# Patient Record
Sex: FEMALE | Race: WHITE | Employment: FULL TIME | URBAN - METROPOLITAN AREA
[De-identification: names, ages, dates, MRNs, and addresses within clinical notes are randomized per-mention and may not be internally consistent; named-entity substitution may affect disease eponyms.]

---

## 2019-05-10 ENCOUNTER — ANESTHESIA - HEALTHEAST (OUTPATIENT)
Dept: SURGERY | Facility: HOSPITAL | Age: 59
End: 2019-05-10

## 2019-05-10 ENCOUNTER — COMMUNICATION - HEALTHEAST (OUTPATIENT)
Dept: SCHEDULING | Facility: CLINIC | Age: 59
End: 2019-05-10

## 2019-05-10 ENCOUNTER — SURGERY - HEALTHEAST (OUTPATIENT)
Dept: SURGERY | Facility: HOSPITAL | Age: 59
End: 2019-05-10

## 2019-05-10 ASSESSMENT — MIFFLIN-ST. JEOR: SCORE: 1136.07

## 2019-05-28 ENCOUNTER — OFFICE VISIT - HEALTHEAST (OUTPATIENT)
Dept: SURGERY | Facility: CLINIC | Age: 59
End: 2019-05-28

## 2019-05-28 DIAGNOSIS — Z48.89 POSTOPERATIVE VISIT: ICD-10-CM

## 2019-05-28 ASSESSMENT — MIFFLIN-ST. JEOR: SCORE: 1146.05

## 2020-10-12 ENCOUNTER — TELEPHONE (OUTPATIENT)
Dept: TRANSPLANT | Facility: CLINIC | Age: 60
End: 2020-10-12

## 2020-10-12 NOTE — TELEPHONE ENCOUNTER
"[facility] BREEZE  [id number]      LIVING KIDNEY DONOR EVALUATION  Donor First Name SONIA Donor MRCHERYL    Donor Last Name AKIRA Completed 10/7/2020 9:24 AM    1960 Record ID [id number]   BREEZE Screen PASSED     Intended Recipient  Recipient First Name ALTRUISTIC Recipient MRN    Recipient Last Name ALTRUISTIC Relationship N/A   Recipient   Recipient Diagnosis    Recipient's ABO      Donor Information  Age 60 Gender Female   Ht 165 cm (5' 5'') Race    Wt 54.4 kg (120 lbs) Ethnicity Not /   BMI 20.00 kg/m  Preferred Language English      Required No     Blood Type AB   Demographics  Home Address [address] # [id number]   [place] Type Mobile   State [place] Alternate #    [facility] Code [address] Type    Country [place] Preferred Contact day    Email [email or web address] Preferred Contact time    &&   Donor's Medical Information  Medical History History of miscarriage   Menopause   Post-Traumatic Stress Disorder (PTSD)   Skin Cancer NOS   s/p  Medications \"Levothyroxine\"   \"magnesium supplement\"   \"multi-vitamin\"   Surgical History Appendectomy      Dilation and Curettage   Breast Augmentation   Breast Implant Replacement Allergies NKDA   Social History EtOH: Occasional (1-2 drinks/week)   Illicit Drug Use: Denies   Tobacco: Denies Self-Reported Functional Status \"I am able to participate in strenuous sports such as swimming, singles tennis, football, basketball, or skiing\"   Family Medical History Cancer (Father, Mother, Aunt or Uncle)   Diabetes (denies)   Heart Disease (Father)   Hypertension (Mother)   Kidney Disease (denies)   Kidney Stones (denies) Exercise Frequency Exercise (>3 times per week)   Review of Organ Systems  Review of Systems Airway or Lungs: No   Blood Disorder: No   Cancer: Yes   Diabetes,Thyroid,Adrenal,Endocrine Disorder: No   Digestive or Liver: No   Female Health: Yes   Heart or Circulatory " System: No   Immune Diseases: No   Kidneys and Bladder: No   Muscles,Bones,Joints: No   Neuro: No   Psych: Yes   &&   Donor's Social Information  Marital Status  Living Accommodation Lives in rented accommodation   Level of Education Graduate or professional degree complete Living Arrangement With no relatives, residential arrangement   Employment Status Full Time Concerns: health and life insurance Yes   Employer [employer] Concerns: job security and lost income No   Occupation      Medical Insurance Status Has medical insurance     High Risk Behavior  High Risk Behaviors Blood transfusion < 12 months. (NO)   Commercial sex < 12 months. (NO)   Illicit IV drug use < 5yrs. (NO)   Other high risk sexual contact < 12 months. (NO)   Reason for Donation  Referral Other ([facility] Brief) Reason for Donation Because I am healthy and I can. It would be a wonderful gift to someone who needs it.   Permission to Disclose Inquiry Yes Patient Comments    Donor Motivation Level Ready to start evaluation with reservations     PCP Contact  PCP Name Dr Mitch Pal   PCP [facility]   PCP [facility]   PCP Phone [phone]   Emergency Contact  First Name Rasheed First Name Marlene   Last Name Keri Last Name Keri   Phone # [phone] Phone # [phone]   Phone Type Mobile Phone Type Mobile   Relationship Spouse Relationship Daughter   Office Use  Reviewed By    Reviewed 10/12/2020 2:32 PM   Admin Folder Archive   Comments    Lost for Followup    Extended Comments    BREEZE ID [email or web address]:[email or web address].[id number] survey status completed   Activity History  Call  Due Date 10/9/2020   Last Modified Date/Time 10/9/2020 1:08 PM   Comments

## 2020-10-13 DIAGNOSIS — Z00.5 TRANSPLANT DONOR EVALUATION: Primary | ICD-10-CM

## 2020-10-13 NOTE — TELEPHONE ENCOUNTER
NDD. Hx Basil cell skin CA 5yrs ago. Followup checks have been OK.Is abo B.Will send info/forms.Sched labs at local  clinic.

## 2020-11-06 ENCOUNTER — ALLIED HEALTH/NURSE VISIT (OUTPATIENT)
Dept: NURSING | Facility: CLINIC | Age: 60
End: 2020-11-06

## 2020-11-06 VITALS
DIASTOLIC BLOOD PRESSURE: 70 MMHG | WEIGHT: 124.6 LBS | BODY MASS INDEX: 20.76 KG/M2 | SYSTOLIC BLOOD PRESSURE: 112 MMHG | HEIGHT: 65 IN

## 2020-11-06 DIAGNOSIS — Z00.5 EXAMINATION OF POTENTIAL DONOR OF ORGAN AND TISSUE: Primary | ICD-10-CM

## 2020-11-06 DIAGNOSIS — Z00.5 TRANSPLANT DONOR EVALUATION: ICD-10-CM

## 2020-11-06 LAB
ABO + RH BLD: NORMAL
ABO + RH BLD: NORMAL
ALBUMIN UR-MCNC: NEGATIVE MG/DL
APPEARANCE UR: CLEAR
BILIRUB UR QL STRIP: NEGATIVE
COLOR UR AUTO: YELLOW
CREAT SERPL-MCNC: 0.71 MG/DL (ref 0.52–1.04)
GFR SERPL CREATININE-BSD FRML MDRD: >90 ML/MIN/{1.73_M2}
GLUCOSE SERPL-MCNC: 82 MG/DL (ref 70–99)
GLUCOSE UR STRIP-MCNC: NEGATIVE MG/DL
HGB BLD-MCNC: 12.2 G/DL (ref 11.7–15.7)
HGB UR QL STRIP: NEGATIVE
KETONES UR STRIP-MCNC: NEGATIVE MG/DL
LEUKOCYTE ESTERASE UR QL STRIP: ABNORMAL
NITRATE UR QL: NEGATIVE
NON-SQ EPI CELLS #/AREA URNS LPF: ABNORMAL /LPF
PH UR STRIP: 6 PH (ref 5–7)
PROT UR-MCNC: 0.07 G/L
PROT/CREAT 24H UR: 0.08 G/G CR (ref 0–0.2)
RBC #/AREA URNS AUTO: ABNORMAL /HPF
SOURCE: ABNORMAL
SP GR UR STRIP: 1.01 (ref 1–1.03)
SPECIMEN EXP DATE BLD: NORMAL
UROBILINOGEN UR STRIP-ACNC: 0.2 EU/DL (ref 0.2–1)
WBC #/AREA URNS AUTO: ABNORMAL /HPF

## 2020-11-06 PROCEDURE — 36415 COLL VENOUS BLD VENIPUNCTURE: CPT | Performed by: SURGERY

## 2020-11-06 PROCEDURE — 99207 PR NO CHARGE NURSE ONLY: CPT

## 2020-11-06 PROCEDURE — 82565 ASSAY OF CREATININE: CPT | Performed by: SURGERY

## 2020-11-06 ASSESSMENT — MIFFLIN-ST. JEOR: SCORE: 1137.31

## 2020-11-06 NOTE — PROGRESS NOTES
I recorded a height, weight and three blood pressures 15 minutes apart.  I validated with the patient they have already had their lab appointment, have one today or one in the future. Eloy Urrutia MA

## 2020-11-07 LAB
CREAT UR-MCNC: 79 MG/DL
MICROALBUMIN UR-MCNC: 5 MG/L
MICROALBUMIN/CREAT UR: 6.87 MG/G CR (ref 0–25)

## 2021-01-21 ENCOUNTER — TELEPHONE (OUTPATIENT)
Dept: TRANSPLANT | Facility: CLINIC | Age: 61
End: 2021-01-21

## 2021-01-21 DIAGNOSIS — Z00.5 TRANSPLANT DONOR EVALUATION: ICD-10-CM

## 2021-01-21 NOTE — TELEPHONE ENCOUNTER
Please sched kidney donor eval for 2/5/21 slot 2.Sched [facility]. COVID/eval labs sched for 2/1.Is signing up for [facility].I will put in orders.

## 2021-01-25 ENCOUNTER — DOCUMENTATION ONLY (OUTPATIENT)
Dept: CARE COORDINATION | Facility: CLINIC | Age: 61
End: 2021-01-25

## 2021-02-01 DIAGNOSIS — Z00.5 TRANSPLANT DONOR EVALUATION: ICD-10-CM

## 2021-02-01 LAB
ABO + RH BLD: NORMAL
ABO + RH BLD: NORMAL
ALBUMIN SERPL-MCNC: 4.3 G/DL (ref 3.4–5)
ALBUMIN UR-MCNC: NEGATIVE MG/DL
ALP SERPL-CCNC: 102 U/L (ref 40–150)
ALT SERPL W P-5'-P-CCNC: 35 U/L (ref 0–50)
ANION GAP SERPL CALCULATED.3IONS-SCNC: 5 MMOL/L (ref 3–14)
APPEARANCE UR: CLEAR
APTT PPP: 29 SEC (ref 22–37)
AST SERPL W P-5'-P-CCNC: 22 U/L (ref 0–45)
BILIRUB DIRECT SERPL-MCNC: 0.1 MG/DL (ref 0–0.2)
BILIRUB SERPL-MCNC: 0.5 MG/DL (ref 0.2–1.3)
BILIRUB UR QL STRIP: NEGATIVE
BLD GP AB SCN SERPL QL: NORMAL
BLOOD BANK CMNT PATIENT-IMP: NORMAL
BUN SERPL-MCNC: 14 MG/DL (ref 7–30)
CALCIUM SERPL-MCNC: 9.4 MG/DL (ref 8.5–10.1)
CHLORIDE SERPL-SCNC: 106 MMOL/L (ref 94–109)
CHOLEST SERPL-MCNC: 249 MG/DL
CMV IGG SERPL QL IA: <0.2 AI (ref 0–0.8)
CO2 SERPL-SCNC: 29 MMOL/L (ref 20–32)
COLOR UR AUTO: YELLOW
CREAT SERPL-MCNC: 0.65 MG/DL (ref 0.52–1.04)
CREAT UR-MCNC: 123 MG/DL
EBV VCA IGG SER QL IA: 5.1 AI (ref 0–0.8)
EBV VCA IGM SER QL IA: <0.2 AI (ref 0–0.8)
ERYTHROCYTE [DISTWIDTH] IN BLOOD BY AUTOMATED COUNT: 12.5 % (ref 10–15)
GFR SERPL CREATININE-BSD FRML MDRD: >90 ML/MIN/{1.73_M2}
GLUCOSE SERPL-MCNC: 92 MG/DL (ref 70–99)
GLUCOSE UR STRIP-MCNC: NEGATIVE MG/DL
HBA1C MFR BLD: 5.3 % (ref 0–5.6)
HBV CORE AB SERPL QL IA: NONREACTIVE
HBV SURFACE AB SERPL IA-ACNC: 0 M[IU]/ML
HBV SURFACE AG SERPL QL IA: NONREACTIVE
HCT VFR BLD AUTO: 41.3 % (ref 35–47)
HCV AB SERPL QL IA: NONREACTIVE
HDLC SERPL-MCNC: 107 MG/DL
HGB BLD-MCNC: 13.6 G/DL (ref 11.7–15.7)
HGB UR QL STRIP: NEGATIVE
HIV 1+2 AB+HIV1 P24 AG SERPL QL IA: NONREACTIVE
INR PPP: 1.01 (ref 0.86–1.14)
KETONES UR STRIP-MCNC: NEGATIVE MG/DL
LABORATORY COMMENT REPORT: NORMAL
LDLC SERPL CALC-MCNC: 116 MG/DL
LEUKOCYTE ESTERASE UR QL STRIP: ABNORMAL
MCH RBC QN AUTO: 31.2 PG (ref 26.5–33)
MCHC RBC AUTO-ENTMCNC: 32.9 G/DL (ref 31.5–36.5)
MCV RBC AUTO: 95 FL (ref 78–100)
MICROALBUMIN UR-MCNC: 7 MG/L
MICROALBUMIN/CREAT UR: 5.39 MG/G CR (ref 0–25)
MUCOUS THREADS #/AREA URNS LPF: PRESENT /LPF
NITRATE UR QL: NEGATIVE
NONHDLC SERPL-MCNC: 142 MG/DL
PH UR STRIP: 6 PH (ref 5–7)
PHOSPHATE SERPL-MCNC: 3.7 MG/DL (ref 2.5–4.5)
PLATELET # BLD AUTO: 169 10E9/L (ref 150–450)
POTASSIUM SERPL-SCNC: 3.7 MMOL/L (ref 3.4–5.3)
PROT SERPL-MCNC: 7.8 G/DL (ref 6.8–8.8)
PROT UR-MCNC: 0.11 G/L
PROT/CREAT 24H UR: 0.09 G/G CR (ref 0–0.2)
RBC # BLD AUTO: 4.36 10E12/L (ref 3.8–5.2)
RBC #/AREA URNS AUTO: 1 /HPF (ref 0–2)
SARS-COV-2 RNA RESP QL NAA+PROBE: NEGATIVE
SARS-COV-2 RNA RESP QL NAA+PROBE: NORMAL
SODIUM SERPL-SCNC: 141 MMOL/L (ref 133–144)
SOURCE: ABNORMAL
SP GR UR STRIP: 1.01 (ref 1–1.03)
SPECIMEN EXP DATE BLD: NORMAL
SPECIMEN SOURCE: NORMAL
SPECIMEN SOURCE: NORMAL
T PALLIDUM AB SER QL: NONREACTIVE
TRIGL SERPL-MCNC: 126 MG/DL
URATE SERPL-MCNC: 3.9 MG/DL (ref 2.6–6)
UROBILINOGEN UR STRIP-MCNC: 0 MG/DL (ref 0–2)
WBC # BLD AUTO: 5 10E9/L (ref 4–11)
WBC #/AREA URNS AUTO: 8 /HPF (ref 0–5)

## 2021-02-01 PROCEDURE — 80061 LIPID PANEL: CPT | Performed by: PATHOLOGY

## 2021-02-01 PROCEDURE — 85610 PROTHROMBIN TIME: CPT | Performed by: PATHOLOGY

## 2021-02-01 PROCEDURE — 84550 ASSAY OF BLOOD/URIC ACID: CPT | Performed by: PATHOLOGY

## 2021-02-01 PROCEDURE — 86803 HEPATITIS C AB TEST: CPT | Mod: 90 | Performed by: PATHOLOGY

## 2021-02-01 PROCEDURE — U0005 INFEC AGEN DETEC AMPLI PROBE: HCPCS | Mod: 90 | Performed by: PATHOLOGY

## 2021-02-01 PROCEDURE — 86706 HEP B SURFACE ANTIBODY: CPT | Mod: 90 | Performed by: PATHOLOGY

## 2021-02-01 PROCEDURE — U0003 INFECTIOUS AGENT DETECTION BY NUCLEIC ACID (DNA OR RNA); SEVERE ACUTE RESPIRATORY SYNDROME CORONAVIRUS 2 (SARS-COV-2) (CORONAVIRUS DISEASE [COVID-19]), AMPLIFIED PROBE TECHNIQUE, MAKING USE OF HIGH THROUGHPUT TECHNOLOGIES AS DESCRIBED BY CMS-2020-01-R: HCPCS | Mod: 90 | Performed by: PATHOLOGY

## 2021-02-01 PROCEDURE — 86665 EPSTEIN-BARR CAPSID VCA: CPT | Mod: 90 | Performed by: PATHOLOGY

## 2021-02-01 PROCEDURE — 86481 TB AG RESPONSE T-CELL SUSP: CPT | Mod: 90 | Performed by: PATHOLOGY

## 2021-02-01 PROCEDURE — 85027 COMPLETE CBC AUTOMATED: CPT | Performed by: PATHOLOGY

## 2021-02-01 PROCEDURE — 86704 HEP B CORE ANTIBODY TOTAL: CPT | Mod: 90 | Performed by: PATHOLOGY

## 2021-02-01 PROCEDURE — 80053 COMPREHEN METABOLIC PANEL: CPT | Performed by: PATHOLOGY

## 2021-02-01 PROCEDURE — 84156 ASSAY OF PROTEIN URINE: CPT | Performed by: PATHOLOGY

## 2021-02-01 PROCEDURE — 86644 CMV ANTIBODY: CPT | Mod: 90 | Performed by: PATHOLOGY

## 2021-02-01 PROCEDURE — 82043 UR ALBUMIN QUANTITATIVE: CPT | Mod: 90 | Performed by: PATHOLOGY

## 2021-02-01 PROCEDURE — 86850 RBC ANTIBODY SCREEN: CPT | Mod: 90 | Performed by: PATHOLOGY

## 2021-02-01 PROCEDURE — 36415 COLL VENOUS BLD VENIPUNCTURE: CPT | Performed by: PATHOLOGY

## 2021-02-01 PROCEDURE — 81001 URINALYSIS AUTO W/SCOPE: CPT | Performed by: PATHOLOGY

## 2021-02-01 PROCEDURE — 87340 HEPATITIS B SURFACE AG IA: CPT | Mod: 90 | Performed by: PATHOLOGY

## 2021-02-01 PROCEDURE — 84100 ASSAY OF PHOSPHORUS: CPT | Performed by: PATHOLOGY

## 2021-02-01 PROCEDURE — 86900 BLOOD TYPING SEROLOGIC ABO: CPT | Mod: 90 | Performed by: PATHOLOGY

## 2021-02-01 PROCEDURE — 82248 BILIRUBIN DIRECT: CPT | Performed by: PATHOLOGY

## 2021-02-01 PROCEDURE — 85730 THROMBOPLASTIN TIME PARTIAL: CPT | Performed by: PATHOLOGY

## 2021-02-01 PROCEDURE — 83036 HEMOGLOBIN GLYCOSYLATED A1C: CPT | Mod: 90 | Performed by: PATHOLOGY

## 2021-02-01 PROCEDURE — 86780 TREPONEMA PALLIDUM: CPT | Performed by: PATHOLOGY

## 2021-02-01 PROCEDURE — 86901 BLOOD TYPING SEROLOGIC RH(D): CPT | Mod: 90 | Performed by: PATHOLOGY

## 2021-02-02 LAB
GAMMA INTERFERON BACKGROUND BLD IA-ACNC: 0.01 IU/ML
M TB IFN-G CD4+ BCKGRND COR BLD-ACNC: 9.99 IU/ML
M TB TUBERC IFN-G BLD QL: NEGATIVE
MITOGEN IGNF BCKGRD COR BLD-ACNC: 0.01 IU/ML
MITOGEN IGNF BCKGRD COR BLD-ACNC: 0.02 IU/ML

## 2021-02-04 ENCOUNTER — TELEPHONE (OUTPATIENT)
Dept: TRANSPLANT | Facility: CLINIC | Age: 61
End: 2021-02-04

## 2021-02-04 DIAGNOSIS — Z00.5 EXAMINATION OF POTENTIAL DONOR OF ORGAN AND TISSUE: Primary | ICD-10-CM

## 2021-02-05 ENCOUNTER — ALLIED HEALTH/NURSE VISIT (OUTPATIENT)
Dept: TRANSPLANT | Facility: CLINIC | Age: 61
End: 2021-02-05
Attending: SURGERY

## 2021-02-05 ENCOUNTER — INFUSION THERAPY VISIT (OUTPATIENT)
Dept: INFUSION THERAPY | Facility: CLINIC | Age: 61
End: 2021-02-05
Attending: INTERNAL MEDICINE

## 2021-02-05 ENCOUNTER — APPOINTMENT (OUTPATIENT)
Dept: TRANSPLANT | Facility: CLINIC | Age: 61
End: 2021-02-05
Attending: SURGERY

## 2021-02-05 ENCOUNTER — ANCILLARY PROCEDURE (OUTPATIENT)
Dept: CT IMAGING | Facility: CLINIC | Age: 61
End: 2021-02-05
Attending: INTERNAL MEDICINE
Payer: COMMERCIAL

## 2021-02-05 ENCOUNTER — OFFICE VISIT (OUTPATIENT)
Dept: NEPHROLOGY | Facility: CLINIC | Age: 61
End: 2021-02-05
Attending: SURGERY

## 2021-02-05 ENCOUNTER — OFFICE VISIT (OUTPATIENT)
Dept: TRANSPLANT | Facility: CLINIC | Age: 61
End: 2021-02-05
Attending: SURGERY

## 2021-02-05 ENCOUNTER — ANCILLARY PROCEDURE (OUTPATIENT)
Dept: GENERAL RADIOLOGY | Facility: CLINIC | Age: 61
End: 2021-02-05
Attending: INTERNAL MEDICINE
Payer: COMMERCIAL

## 2021-02-05 VITALS
WEIGHT: 121.7 LBS | BODY MASS INDEX: 20.28 KG/M2 | SYSTOLIC BLOOD PRESSURE: 113 MMHG | DIASTOLIC BLOOD PRESSURE: 71 MMHG | OXYGEN SATURATION: 98 % | HEIGHT: 65 IN | HEART RATE: 67 BPM

## 2021-02-05 VITALS
WEIGHT: 121.7 LBS | SYSTOLIC BLOOD PRESSURE: 113 MMHG | HEIGHT: 65 IN | OXYGEN SATURATION: 98 % | HEART RATE: 67 BPM | BODY MASS INDEX: 20.28 KG/M2 | DIASTOLIC BLOOD PRESSURE: 71 MMHG

## 2021-02-05 DIAGNOSIS — Z00.5 TRANSPLANT DONOR EVALUATION: ICD-10-CM

## 2021-02-05 DIAGNOSIS — Z00.5 TRANSPLANT DONOR EVALUATION: Primary | ICD-10-CM

## 2021-02-05 DIAGNOSIS — Z00.5 EXAMINATION OF POTENTIAL DONOR OF ORGAN AND TISSUE: ICD-10-CM

## 2021-02-05 LAB
ALBUMIN UR-MCNC: NEGATIVE MG/DL
APPEARANCE UR: CLEAR
BILIRUB UR QL STRIP: NEGATIVE
COLOR UR AUTO: NORMAL
GLUCOSE UR STRIP-MCNC: NEGATIVE MG/DL
HGB UR QL STRIP: NEGATIVE
INTERPRETATION ECG - MUSE: NORMAL
KETONES UR STRIP-MCNC: NEGATIVE MG/DL
LEUKOCYTE ESTERASE UR QL STRIP: NEGATIVE
NITRATE UR QL: NEGATIVE
PH UR STRIP: 7 PH (ref 5–7)
RBC #/AREA URNS AUTO: <1 /HPF (ref 0–2)
SOURCE: NORMAL
SP GR UR STRIP: 1.01 (ref 1–1.03)
UROBILINOGEN UR STRIP-MCNC: 0 MG/DL (ref 0–2)
WBC #/AREA URNS AUTO: 0 /HPF (ref 0–5)

## 2021-02-05 PROCEDURE — 82542 COL CHROMOTOGRAPHY QUAL/QUAN: CPT | Performed by: INTERNAL MEDICINE

## 2021-02-05 PROCEDURE — 74175 CTA ABDOMEN W/CONTRAST: CPT | Performed by: RADIOLOGY

## 2021-02-05 PROCEDURE — 99203 OFFICE O/P NEW LOW 30 MIN: CPT | Performed by: TRANSPLANT SURGERY

## 2021-02-05 PROCEDURE — 71046 X-RAY EXAM CHEST 2 VIEWS: CPT | Mod: GC | Performed by: RADIOLOGY

## 2021-02-05 PROCEDURE — 99207 PR SATISFY VISIT NUMBER: CPT | Performed by: TRANSPLANT SURGERY

## 2021-02-05 PROCEDURE — 99245 OFF/OP CONSLTJ NEW/EST HI 55: CPT

## 2021-02-05 PROCEDURE — 81001 URINALYSIS AUTO W/SCOPE: CPT | Performed by: INTERNAL MEDICINE

## 2021-02-05 PROCEDURE — 250N000011 HC RX IP 250 OP 636: Mod: JW | Performed by: INTERNAL MEDICINE

## 2021-02-05 PROCEDURE — 96374 THER/PROPH/DIAG INJ IV PUSH: CPT

## 2021-02-05 RX ORDER — IOPAMIDOL 755 MG/ML
100 INJECTION, SOLUTION INTRAVASCULAR ONCE
Status: COMPLETED | OUTPATIENT
Start: 2021-02-05 | End: 2021-02-05

## 2021-02-05 RX ORDER — LEVOTHYROXINE SODIUM 25 UG/1
25 TABLET ORAL DAILY
COMMUNITY
Start: 2019-04-14

## 2021-02-05 RX ADMIN — IOPAMIDOL 100 ML: 755 INJECTION, SOLUTION INTRAVASCULAR at 12:41

## 2021-02-05 RX ADMIN — IOHEXOL 5 ML: 300 INJECTION, SOLUTION INTRAVENOUS at 08:15

## 2021-02-05 SDOH — HEALTH STABILITY: MENTAL HEALTH: HOW OFTEN DO YOU HAVE 6 OR MORE DRINKS ON ONE OCCASION?: NOT ASKED

## 2021-02-05 SDOH — ECONOMIC STABILITY: FOOD INSECURITY: WITHIN THE PAST 12 MONTHS, YOU WORRIED THAT YOUR FOOD WOULD RUN OUT BEFORE YOU GOT MONEY TO BUY MORE.: NOT ASKED

## 2021-02-05 SDOH — HEALTH STABILITY: MENTAL HEALTH: HOW MANY STANDARD DRINKS CONTAINING ALCOHOL DO YOU HAVE ON A TYPICAL DAY?: NOT ASKED

## 2021-02-05 SDOH — ECONOMIC STABILITY: TRANSPORTATION INSECURITY
IN THE PAST 12 MONTHS, HAS THE LACK OF TRANSPORTATION KEPT YOU FROM MEDICAL APPOINTMENTS OR FROM GETTING MEDICATIONS?: NOT ASKED

## 2021-02-05 SDOH — ECONOMIC STABILITY: INCOME INSECURITY: HOW HARD IS IT FOR YOU TO PAY FOR THE VERY BASICS LIKE FOOD, HOUSING, MEDICAL CARE, AND HEATING?: NOT ASKED

## 2021-02-05 SDOH — HEALTH STABILITY: MENTAL HEALTH: HOW OFTEN DO YOU HAVE A DRINK CONTAINING ALCOHOL?: NOT ASKED

## 2021-02-05 SDOH — ECONOMIC STABILITY: FOOD INSECURITY: WITHIN THE PAST 12 MONTHS, THE FOOD YOU BOUGHT JUST DIDN'T LAST AND YOU DIDN'T HAVE MONEY TO GET MORE.: NOT ASKED

## 2021-02-05 SDOH — ECONOMIC STABILITY: TRANSPORTATION INSECURITY
IN THE PAST 12 MONTHS, HAS LACK OF TRANSPORTATION KEPT YOU FROM MEETINGS, WORK, OR FROM GETTING THINGS NEEDED FOR DAILY LIVING?: NOT ASKED

## 2021-02-05 SDOH — HEALTH STABILITY: MENTAL HEALTH: HOW OFTEN DO YOU HAVE A DRINK CONTAINING ALCOHOL?: 4 OR MORE TIMES A WEEK

## 2021-02-05 ASSESSMENT — MIFFLIN-ST. JEOR
SCORE: 1124.16
SCORE: 1124.16

## 2021-02-05 NOTE — TELEPHONE ENCOUNTER
I called today to provide education on living kidney donation.  The format for our conversation was talking on the phone and looking simultaneously at the slides for the Living Kidney Donor Informed Consent and SRTR form.  The patient has offered to be a nondirect donor.      Discussed surgery hospitalization and recovery.    Knows when and how to obtain evaluation results and the process for scheduling surgery.  Has received and reviewed the donor education materials including the Informed Consent for Living Kidney Donation.  The patient will sign the consent for donor evaluation and send it back to the Transplant Office.  Reviewed SRTR Data sheet.  Donor was Provided Living Donor Collective (LDC) Materials? Yes  Donor has agreed to be contacted by SRTR for follow-up LDC Questions? Yes  Reviewed that our center is participating in the Living Donor Collective to study the long-term outcomes of living organ donation.  Patient was born in the [place]  Medical/Surgical History:  Basal Cell skin cancer, MOHS.  Gets annual checkups.    She has had 2 C Sections, D & C.  Appendectomy and Breast augmentation.  She is in menopause and has history of PTSD.    The patient states is a Non-Smoker.  The patient has history of surgical procedure and did not have any issues with anesthesia.  Requested routine cancer screening tests: she is all up to date, had a mammogram this week.  See CareEverywhere.     Work and Timing:  She is  at the [employer] in the [facility].  She lives by her self, renting the basement apartment of her friends home.      Nondirected donor Program:  I reviewed the possible ways that a NonDirect Donor can give a kidney.  One being running a matchrun after setting a date and the kidney would be allocated to the person at the top of the list.  I also reviewed that details pertaining to the Paired Exchange programs.                              1.National Kidney Registry                           2.Internal Exchange    3.  National Kidney Registry Family Voucher program.  I discussed with patient the matching procedure and logistics of each program.  Patient has been sent consent forms for the KPD program via [employer].  [facility] Logistics:    I reviewed that in KPD you are not able to choose your match, but they may decline a match.  I showed possible scenarios that can happen when a chain is put together where there is a possibility of helping more than one candidate receive a kidney.     I reviewed that the timeline for listing and finding a match is unknown, there may be significant waiting time to find a matched based on blood type.     I described the timing of a typical KPD event, where the donor cases go early in the morning and then the kidneys are shipped to the recipient site.     That the donor s kidney could be lost in transport, and other potentially negative consequences related to shipping a kidney.  Insurance:   I also reviewed that in KPD program the insurance of the matched recipient will be paying the donation costs.  I also reviewed:              1. The possibility that the matched candidate s insurance might not cover travel costs if the paired donor travels to the matched recipient transplant hospital.              2. The possibility of the paired donor s name appearing on the matched candidate s insurance estimation of benefits.    [facility] Blood Draw and Testing requirements:   I reviewed that frequent lab draws are necessary in the KPD program.  I explained that NKR will send a kit when we initiate listing so that blood can be frozen to be used for exploratory crossmatches.  I stated that there is also additional testing needed when a match offer is given for confirmatory compatibility testing and infectious disease testing.  I reviewed that the donor's evaluation is good for one year.  If we are waiting after one year, then labs will need to be done to update the evaluation.     Correspondence with Matched Recipient:  I reviewed that it is okay to send a card to the recipient, that we ask that No identifiers be on the correspondence.  I stated that if all parties are willing, the Transplant office can facilitate meetings between matched donors and recipients.  I reviewed that the patient has the right to withdraw from participation in the D program at any time, for any reason.     Living Kidney Donor Informed Consent per [facility] Policy for [facility] Coordinators:  I provided to the patient education and instruction about all phases of the living donation process that included:    A copy of the entire consent with SRTR outcomes.    Information regarding the financial impact of donation Results of medical and psychosocial evaluations.    Information stating that the [facility] may refuse you as a donor,  however, you could be evaluated by another transplant program with different selection criteria.    Instructions for pre- and post-operative care that state as a potential donor, you must commit to postoperative follow-up testing at 6 weeks, 6 months, 1 year, and 2 years.    Information was presented in a language in which the patient was able to engage in meaningful dialogue by use of an  if needed.    It is a federal crime for any person to knowingly acquire, obtain or otherwise transfer any human organ for valuable consideration.    [facility] takes all reasonable precautions to provide confidentiality for you, and the recipient. However, all health information obtained during the evaluation is subject to the same regulations as all records and could reveal conditions that must be reported to local, state, or federal public health authorities.    Disclosure of alternative procedures or courses of treatment for the recipient, including  donor transplant.    A  donor kidney may become available for the recipient before  your donor evaluation is complete or transplant occurs.    Recipients are determined to be transplant candidates based on specific guidelines and clinical  judgement.    The recipient may have risk factors for increased morbidity or mortality that are above local or national averages do not prohibit transplantation and are not disclosed to you.  Lorenza Betancourt RN  Living Donor Coordinator  02/04/2021 6:30 PM

## 2021-02-05 NOTE — LETTER
Date:February 18, 2021      Patient was self referred, no letter generated. Do not send.        [facility] Health Information

## 2021-02-05 NOTE — LETTER
2/5/2021       RE: Vianca Saavedra  [address] [facility] [address]     Dear Colleague,    Thank you for referring your patient, Vianca Saavedra, to the [facility] [place] at [facility]. Please see a copy of my visit note below.    TRANSPLANT NEPHROLOGY DONOR EVALUATION    Assessment and Plan:  # Prospective Kidney Transplant Donor: Patient with no issues that need to be addressed prior to donation. Patient's blood pressure is acceptable at this visit, kidney function appears to be acceptable with Iohexol pending, and urinalysis is bland.    #Mild leukocyturia: We will repeat UA midstream clean-catch.    Discussed the risks of donating a kidney, including the surgical risk and the possible risks of living with one kidney.    Education about expected post-donation kidney function and how chronic kidney disease (CKD) and end stage kidney disease (ESKD) might potentially impact the donor in the future, include, but not limited to:       - On average, donors will have 25-35% permanent loss of kidney function at donation.       - Baseline risk of ESKD may slightly exceed that of members of the general population with the same demographic profile.       - Donor risks must be interpreted in light of known epidemiology of both CKD or ESKD, such as that CKD generally develops in midlife (40-50 years old) and ESKD generally develops after age 60.       - Medical evaluation of young potential donors cannot predict lifetime risk of CKD or ESKD.       - Donors may be at higher risk for CKD if they sustain damage to the remaining kidney.       - Development of CKD and progression of ESKD may be more rapid with only 1 kidney.       - Some type of kidney replacement therapy, either kidney transplant or dialysis, is required when reaching ESKD.    Potential medical or surgical risks include, but not limited to:       - Death.       -  Scars, pain, fatigue, and other consequences typical of any surgical procedure.       - Decreased kidney function.       - Abdominal or bowel symptoms, such as bloating and nausea, and developing bowel obstruction.       - Kidney failure (ESKD) and the need for a kidney transplant or dialysis for the donor.       - Impact of obesity, hypertension, or other donor-specific medical conditions on morbidity and mortality of the potential donor.    Patients overall evaluation will be discussed with the transplant team and a final recommendation on the patients' suitability to be a kidney transplant donor will be made at that time.    Consult:  Vianca Saavedra was seen in consultation at the request of Dr. Kj Wei for evaluation as a potential kidney transplant donor.    Reason for Visit:  Vianca Saavedra is a 60 year old female who presents for a kidney donor evaluation.  Patient would like to be a non-directed donor.    HPI:      Vianca is a delightful 60-year-old lady who is generally healthy.  She is on levothyroxine for hypothyroidism.  She was diagnosed with an element of hearing loss 5 years ago but not problematic.  She specifically denied any history of hearing loss with kidney disease in the family.  She works full-time as an .  She leads active lifestyle with regular exercises and brisk walking 3 times a week or more.  She does not smoke.  She had history of basal cell cancer on her arm and her chest status post excision.  She sees dermatology once a year.  She is up-to-date on her age-appropriate cancer screening per her report.  She will be due again for colonoscopy in 2 years.         Kidney Disease Hx:       h/o Kidney Problems: No  Family h/o Genetic Kidney Disease: No       h/o HTN: No    Usual BP: wnl       h/o Protein in Urine: No  h/o Blood in Urine: No       h/o Kidney Stones: No  h/o Kidney Injury: No       h/o Recurrent UTI: No  h/o Genitourinary Problems: No       h/o  Chronic NSAID Use: No         Other Medical Hx:       h/o DM: No             h/o Gastrointestinal, Pancreas or Liver Problems: No       h/o Lung or Heart Problems: No       h/o Hematologic Problems: No  h/o Bleeding or Clotting Problems: No       h/o Cancer: Yes: basal cell cancer        h/o Infection Problems: No       h/o Gestational DM: No      h/o Preeclampsia: No         Skin Cancer Risk:       h/o more than 50 moles: No       h/o extensive sun exposure: Yes        h/o melanoma: No       Family h/o melanoma: Yes: Aunt          Mental Health Assessment:       h/o Depression: No       h/o Psychiatric Illness: No       h/o Suicidal Attempt(s): No    Review Of Systems:   A comprehensive review of systems was obtained and negative, except as noted in the HPI or PMH.    Past Medical History:   History was taken from the patient as noted below.  Past Medical History:   Diagnosis Date     Acquired hypothyroidism      Known health problems: none        Past Social History:   Past Surgical History:   Procedure Laterality Date     APPENDECTOMY        SECTION       DILATION AND CURETTAGE       Personal or family history of anesthesia problems: No    Family History:   Family History   Problem Relation Age of Onset     Lung Cancer Mother      Cirrhosis Father           Specific Family History:       FH of DM: No       FH of CAD: Yes   FH of HTN: Yes        FH of Cancer: Yes (lung cancer, and melanoma)  FH of Kidney Cancer: No    Personal History:   Social History     Socioeconomic History     Marital status:      Spouse name: Not on file     Number of children: Not on file     Years of education: Not on file     Highest education level: Not on file   Occupational History     Comment: admin   Social Needs     Financial resource strain: Not on file     Food insecurity     Worry: Not on file     Inability: Not on file     Transportation needs     Medical: Not on file     Non-medical: Not on file   Tobacco Use      "Smoking status: Never Smoker     Smokeless tobacco: Never Used   Substance and Sexual Activity     Alcohol use: Yes     Frequency: 4 or more times a week     Drug use: Never     Sexual activity: Not on file   Lifestyle     Physical activity     Days per week: Not on file     Minutes per session: Not on file     Stress: Not on file   Relationships     Social connections     Talks on phone: Not on file     Gets together: Not on file     Attends Christian service: Not on file     Active member of club or organization: Not on file     Attends meetings of clubs or organizations: Not on file     Relationship status: Not on file     Intimate partner violence     Fear of current or ex partner: Not on file     Emotionally abused: Not on file     Physically abused: Not on file     Forced sexual activity: Not on file   Other Topics Concern     Not on file   Social History Narrative     Not on file          Specific Social History:       Health Insurance Status: Yes       Employment Status: Full time  Occupation:                        Living Arrangements: lives alone       Social Support: Yes       Presence of increased risk for disease transmission behaviors as defined by [facility] guidelines: No        Allergies:  No Known Allergies    Medications:  Prior to Admission medications    Medication Sig Start Date End Date Taking? Authorizing Provider   levothyroxine (SYNTHROID/LEVOTHROID) 25 MCG tablet Take 25 mcg by mouth daily 4/14/19   Reported, Patient       Vitals:  Vital Signs 2/5/2021 2/5/2021 2/5/2021   Systolic 105 108 113   Diastolic 69 72 71   Pulse 67 - -   Weight (LB) 121 lb 11.2 oz - -   Height 5' 4.764\" - -   BMI (Calculated) 20.4 - -   O2 98 - -       Exam:   GENERAL APPEARANCE: alert and no distress  HENT: mouth without ulcers or lesions  LYMPHATICS: no cervical or supraclavicular nodes  RESP: lungs clear to auscultation - no rales, rhonchi or wheezes  CV: regular rhythm, normal rate, no rub, no murmur  EDEMA: no LE " edema bilaterally  ABDOMEN: soft, nondistended, nontender, bowel sounds normal  MS: extremities normal - no gross deformities noted, no evidence of inflammation in joints, no muscle tenderness  SKIN: no rash    Results:   Labs and imaging were ordered for this visit and reviewed by me.  Recent Results (from the past 336 hour(s))   Protein  random urine with Creat Ratio    Collection Time: 02/01/21  8:05 AM   Result Value Ref Range    Protein Random Urine 0.11 g/L    Protein Total Urine g/gr Creatinine 0.09 0 - 0.2 g/g Cr   Albumin Random Urine Quantitative with Creat Ratio    Collection Time: 02/01/21  8:05 AM   Result Value Ref Range    Creatinine Urine 123 mg/dL    Albumin Urine mg/L 7 mg/L    Albumin Urine mg/g Cr 5.39 0 - 25 mg/g Cr   Routine UA with microscopic    Collection Time: 02/01/21  8:05 AM   Result Value Ref Range    Color Urine Yellow     Appearance Urine Clear     Glucose Urine Negative NEG^Negative mg/dL    Bilirubin Urine Negative NEG^Negative    Ketones Urine Negative NEG^Negative mg/dL    Specific Gravity Urine 1.015 1.003 - 1.035    Blood Urine Negative NEG^Negative    pH Urine 6.0 5.0 - 7.0 pH    Protein Albumin Urine Negative NEG^Negative mg/dL    Urobilinogen mg/dL 0.0 0.0 - 2.0 mg/dL    Nitrite Urine Negative NEG^Negative    Leukocyte Esterase Urine Moderate (A) NEG^Negative    Source Midstream Urine     WBC Urine 8 (H) 0 - 5 /HPF    RBC Urine 1 0 - 2 /HPF    Mucous Urine Present (A) NEG^Negative /LPF   Uric acid    Collection Time: 02/01/21  8:06 AM   Result Value Ref Range    Uric Acid 3.9 2.6 - 6.0 mg/dL   Quantiferon TB Gold Plus    Collection Time: 02/01/21  8:06 AM    Specimen: Blood   Result Value Ref Range    MTB Quantiferon Result Negative NEG^Negative    TB1 Ag minus Nil 0.01 IU/mL    TB2 Ag minus Nil 0.02 IU/mL    Mitogen minus Nil 9.99 IU/mL    NIL Result 0.01 IU/mL   EBV Capsid Antibody IgM    Collection Time: 02/01/21  8:06 AM   Result Value Ref Range    EBV Capsid Antibody IgM  <0.2 0.0 - 0.8 AI   EBV Capsid Antibody IgG    Collection Time: 02/01/21  8:06 AM   Result Value Ref Range    EBV Capsid Antibody IgG 5.1 (H) 0.0 - 0.8 AI   CMV Antibody IgG    Collection Time: 02/01/21  8:06 AM   Result Value Ref Range    CMV Antibody IgG <0.2 0.0 - 0.8 AI   CBC with platelets    Collection Time: 02/01/21  8:06 AM   Result Value Ref Range    WBC 5.0 4.0 - 11.0 10e9/L    RBC Count 4.36 3.8 - 5.2 10e12/L    Hemoglobin 13.6 11.7 - 15.7 g/dL    Hematocrit 41.3 35.0 - 47.0 %    MCV 95 78 - 100 fl    MCH 31.2 26.5 - 33.0 pg    MCHC 32.9 31.5 - 36.5 g/dL    RDW 12.5 10.0 - 15.0 %    Platelet Count 169 150 - 450 10e9/L   Partial thromboplastin time    Collection Time: 02/01/21  8:06 AM   Result Value Ref Range    PTT 29 22 - 37 sec   Treponema Abs w Reflex to RPR and Titer    Collection Time: 02/01/21  8:06 AM   Result Value Ref Range    Treponema Antibodies Nonreactive NR^Nonreactive   INR    Collection Time: 02/01/21  8:06 AM   Result Value Ref Range    INR 1.01 0.86 - 1.14   HIV Antigen Antibody Combo Pretransplant    Collection Time: 02/01/21  8:06 AM   Result Value Ref Range    HIV Antigen Antibody Combo Pretransplant Nonreactive NR^Nonreactive   Hepatitis C antibody    Collection Time: 02/01/21  8:06 AM   Result Value Ref Range    Hepatitis C Antibody Nonreactive NR^Nonreactive   Hepatitis B surface antigen    Collection Time: 02/01/21  8:06 AM   Result Value Ref Range    Hep B Surface Agn Nonreactive NR^Nonreactive   Hepatitis B Surface Antibody    Collection Time: 02/01/21  8:06 AM   Result Value Ref Range    Hepatitis B Surface Antibody 0.00 <8.00 m[IU]/mL   Hepatitis B core antibody    Collection Time: 02/01/21  8:06 AM   Result Value Ref Range    Hepatitis B Core Mis Nonreactive NR^Nonreactive   Hemoglobin A1c    Collection Time: 02/01/21  8:06 AM   Result Value Ref Range    Hemoglobin A1C 5.3 0 - 5.6 %   Phosphorus    Collection Time: 02/01/21  8:06 AM   Result Value Ref Range    Phosphorus 3.7  2.5 - 4.5 mg/dL   Comprehensive metabolic panel    Collection Time: 02/01/21  8:06 AM   Result Value Ref Range    Sodium 141 133 - 144 mmol/L    Potassium 3.7 3.4 - 5.3 mmol/L    Chloride 106 94 - 109 mmol/L    Carbon Dioxide 29 20 - 32 mmol/L    Anion Gap 5 3 - 14 mmol/L    Glucose 92 70 - 99 mg/dL    Urea Nitrogen 14 7 - 30 mg/dL    Creatinine 0.65 0.52 - 1.04 mg/dL    GFR Estimate >90 >60 mL/min/[1.73_m2]    GFR Estimate If Black >90 >60 mL/min/[1.73_m2]    Calcium 9.4 8.5 - 10.1 mg/dL    Bilirubin Total 0.5 0.2 - 1.3 mg/dL    Albumin 4.3 3.4 - 5.0 g/dL    Protein Total 7.8 6.8 - 8.8 g/dL    Alkaline Phosphatase 102 40 - 150 U/L    ALT 35 0 - 50 U/L    AST 22 0 - 45 U/L   Lipid Profile    Collection Time: 02/01/21  8:06 AM   Result Value Ref Range    Cholesterol 249 (H) <200 mg/dL    Triglycerides 126 <150 mg/dL    HDL Cholesterol 107 >49 mg/dL    LDL Cholesterol Calculated 116 (H) <100 mg/dL    Non HDL Cholesterol 142 (H) <130 mg/dL   ABO/Rh type and screen    Collection Time: 02/01/21  8:06 AM   Result Value Ref Range    ABO B     RH(D) Pos     Antibody Screen Neg     Test Valid Only At          [facility]    Specimen Expires 02/04/2021    Bilirubin direct    Collection Time: 02/01/21  8:06 AM   Result Value Ref Range    Bilirubin Direct 0.1 0.0 - 0.2 mg/dL   Asymptomatic COVID-19 Virus (Coronavirus) by PCR    Collection Time: 02/01/21  8:11 AM    Specimen: Nasopharyngeal   Result Value Ref Range    COVID-19 Virus PCR to [facility] - Source Nasopharyngeal     COVID-19 Virus PCR to [facility] - Result       Test received-See reflex to IDDL test SARS CoV2 (COVID-19) Virus RT-PCR   SARS-CoV-2 COVID-19 Virus (Coronavirus) by PCR    Collection Time: 02/01/21  8:11 AM    Specimen: Nasopharyngeal   Result Value Ref Range    SARS-CoV-2 Virus Specimen Source Nasopharyngeal     SARS-CoV-2 PCR Result NEGATIVE     SARS-CoV-2 PCR Comment       Testing was performed using the [employer]  Xpress SARS-CoV-2 Assay on the Cepheid Gene-Xpert   Instrument Systems. Additional information about this Emergency Use Authorization (EUA)   assay can be found via the Lab Guide.           Again, thank you for allowing me to participate in the care of your patient.      Sincerely,     Kidney Donor Parker

## 2021-02-05 NOTE — PROGRESS NOTES
Today I tried reaching the patient twice by phone to check in with her to see if she had questions for me regarding her donor evaluation day.  Today she was in clinic to meet the donor team.  I left voicemail with her with my contact number and encouraged her to call me if she should have any concerns.  Please see Telephone encounter on 2/4/21 for my notation on Informed Consent education given.    I will prepare her chart for presentation at the Wednesday donor team meeting and will plan to call the patient with her results following the meeting.  Lorenza Betancourt RN  Living Donor Coordinator  02/05/2021 3:17 PM

## 2021-02-05 NOTE — PROGRESS NOTES
[facility] Solid Organ Transplant  Outpatient MNT: Kidney Donor Evaluation    Current BMI: 20.4 (HT 64.75 in,  lbs/55 kg)  BMI is within recommendation of <30 for kidney donation    8 Year Estimated Risk of T2DM  </= 3%     Time Spent: 15 minutes  Visit Type: Initial   Referring Physician: Eriberto  Pt accompanied by: self     Nutrition Assessment  Vitamins, Supplements, Pertinent Meds: occ MVI, vit D, CALM magnesium  Herbal Medicines/Supplements: none     Weight hx: over stable     Food Security Survey  Question 1.  In the last 12 months: We worried food would run out before we had money to buy more. Never True    Question 2.  In the last 12 months: The food we bought just didn't last and we didn't have money to buy more. Never True    Did the patient answer Sometimes True or Often True to EITHER Question 1 or Question 2? No    Additional points of discussion: transportation, access to grocery stores-->no concerns     Diet Recall  Breakfast Oatmeal with banana and almonds; overnight oats with fruit; eggs and toast    Lunch Leftovers    Dinner Caesar salad; chicken and sweet potato    Snacks Breakfast bar, yogurt, cheese/crackers   Beverages Coffee (sweetener and half/half), water/sparkling water, 1 Diet Coke/day    Alcohol 5 drinks/week    Dining out 2x/month      Physical Activity  Body pump 3x/week  Occasional body flow  Walking outside 3x/week (30-45 min)    Labs  Recent Labs   Lab Test 02/01/21  0806   CHOL 249*      *   TRIG 126       FBG = 92 (2/1)  A1c = 5.3 (2/1)   BP = wnl x 3     Prediction of Incident Diabetes Mellitus in Middle-aged Adults: The [place] Offspring Study  Husam Jordan MD; James B. Meigs, MD, MPH; Tana Hernandez, PhD; Brandy Rojas MD, MPH; Raad Agrawal MD; Art Voss Sr,   PhD  Pt's estimated risk for T2DM (per Table 6 above)  Pt received points for the following criteria: none   Total points: 0  8-Year estimated risk of T2DM: </=  3%    Nutrition Diagnosis  No nutrition diagnosis identified at this time.    Nutrition Intervention  Nutrition education provided:  Reviewed overall healthy diet guidelines for pre and post kidney donation. Discussed maintenance of a healthy weight and Na+ intake <3000 mg/day (<2000 mg/day if HTN). Reviewed elevated cholesterol levels and some dietary modifications to help with this (ie ensure adequate fiber).     Avoid the following post op d/t unknown effects on the organs:  - Herbal, Chinese, holistic, chiropractic, natural, alternative medicines and supplements  - Detoxes and cleanses  - Weight loss pills  - Protein powders or other products with extracts or herbs (ie green tea extract)    Patient Understanding: Pt verbalized understanding of education provided.  Expected Engagement: Good  Follow-Up Plans: PRN     Nutrition Goals  No nutrition goals identified at this time     Ana Laura Pineda RD, LD, CCTD

## 2021-02-05 NOTE — PROGRESS NOTES
Donor Iohexol test    Vianca Saavedra presents today to [facility] for a Donor Iohexol test.      Progress note:  ID verified by name and .     The following information was verified with the patient:  Female Patients is there any possibility of being pregnant No  Is there a history of allergy (skin rash, swelling, ect) to:   A.  Iodine (except skin reactions to betadine): No   B. Intravenous radio-contrast agents: No   C. Seafood No     present during visit today: Not Applicable.    R.N. provided patient with educational handout regarding timed test. Yes     Iohexol administered over 2 minutes VIA butterfly  Positive blood return verified before and after injection.   20 gauge PIV placed in LT AC  for blood draws and CT this afternoon.    Medication administered:  Iohexol (Omnipaque 300mg iodine/ml concentration) 5 mls.    Start time: 815  Stop time: 817    Drug Waste Record    Drug Name: Iohexol  Dose: 5 ml  Route administered: IV  NDC #: [id number]  Amount of waste(mL):5 ml  Reason for waste: Single use vial       Administrations This Visit     iohexol (OMNIPAQUE 300) injection 5 mL     Admin Date  2021 Action  Given Dose  5 mL Route  Intravenous Administered By  Meghan Petit RN                Evaluation nurse in transplant to draw labs at 2 and 4 hours post iohexol administration.  Patient given a slip with the times to get labs drawn and verbalized understanding of the plan.    Patient tolerated the procedure:  Yes    After the infusion patient was discharged to the next appointment.    Meghan Petit RN

## 2021-02-05 NOTE — LETTER
2021         RE: Vianca Saavedra  [address] [facility] [address]        Dear Colleague,    Thank you for referring your patient, Vianca Saavedra, to the [facility] TRANSPLANT [facility]. Please see a copy of my visit note below.    [facility]  Consult Note     Medical record number: [id number]  YOB: 1960,     Date of Visit:  21  Consult requested by the patient for evaluation of kidney donation candidacy.    Assessment and Recommendations: Ms. Saavedra appears to be a good candidate for kidney donation at this point in the evaluation. The following issues will need to be addressed prior to formal review:    Iohexal GFR  CT angio    PMH: Basal cell skin cancer            S/P -  x 2            appendectomy     Potential recipient: nondirected    Risks of the surgical procedure including but not limited to the rare risk of mortality discussed in detail. Patient verbalized good understanding and had several pertinent questions which were answered.     The majority of our visit today was spent in counseling regarding the medical and surgical risks of kidney donation; the typical yony-and post-operative experience and recovery/return to work pattern; restrictions related to the surgery (driving; lifting; exercise).      We also talked about post-op visits and longer term health care maintenance, as well as the implications of having one remaining kidney. This discussion included, but was not limited to rates of complications such as bleeding, infection, need for transfusion, reoperation, other organ injury, future bowel obstruction, incisional hernia, port site pain, varicocele, venous thrombosis, pulmonary embolism, renal failure, and death (3 per 10,000).     We discussed the option of donating directly to a recipient in our program; and the advantage would be the option to choose the date of donation. And we discussed the  advantage of starting  a  chain  in the national paired exchange system; and the advantage would be that multiple transplants would occur as a result of the donation (but the logistics were more complicated).  I explained how a chain was developed and worked.    At the conclusion of the visit, all questions had been answered.  I explained that her candidacy for donation will be reviewed at our Multidisciplinary Donor Selection Committee, and that he/she would subsequently be contacted   by his/her coordinator.  I recommended that she call her coordinator if there any additional questions at the end of the evaluation process; and that we would be glad to spend time discussing any concerns.     Total time: 40 minutes  Counselling time: 30 minutes        Kj Wei MD  Surgical Director, Kidney Transplantation                                                                                                        ---------------------------------------------------------------------------------------------------    HPI: Ms. Saavedra wishes to donate a kidney to nondirected.           NO  Personal history of cancer    []         Comment:     Personal history of kidney problems   []         Comment:   Personal history of diabetes    []         Comment:                  Bladder emptying problems (prostate, urinary retention) []         Comment:   Neck or Back problems:     []         Comment:   Constipation      []         Comment:       Frequent NSAID use:         []         Comment:       Other:        []         Comment:                Past Medical History:   Diagnosis Date     Acquired hypothyroidism      Known health problems: none      Past Surgical History:   Procedure Laterality Date     APPENDECTOMY        SECTION       DILATION AND CURETTAGE       Family History   Problem Relation Age of Onset     Lung Cancer Mother      Cirrhosis Father      Social History     Socioeconomic History     Marital  status:      Spouse name: Not on file     Number of children: Not on file     Years of education: Not on file     Highest education level: Not on file   Occupational History     Comment: admin   Social Needs     Financial resource strain: Not on file     Food insecurity     Worry: Not on file     Inability: Not on file     Transportation needs     Medical: Not on file     Non-medical: Not on file   Tobacco Use     Smoking status: Never Smoker     Smokeless tobacco: Never Used   Substance and Sexual Activity     Alcohol use: Yes     Frequency: 4 or more times a week     Drug use: Never     Sexual activity: Not on file   Lifestyle     Physical activity     Days per week: Not on file     Minutes per session: Not on file     Stress: Not on file   Relationships     Social connections     Talks on phone: Not on file     Gets together: Not on file     Attends Worship service: Not on file     Active member of club or organization: Not on file     Attends meetings of clubs or organizations: Not on file     Relationship status: Not on file     Intimate partner violence     Fear of current or ex partner: Not on file     Emotionally abused: Not on file     Physically abused: Not on file     Forced sexual activity: Not on file   Other Topics Concern     Not on file   Social History Narrative     Not on file       ROS:   CONSTITUTIONAL:  No fevers or chills  EYES: negative for icterus  ENT:  negative for hearing loss, tinnitus and sore throat  RESPIRATORY:  negative for cough, sputum, dyspnea  CARDIOVASCULAR:  negative for chest pain  GASTROINTESTINAL:  negative for nausea, vomiting, diarrhea or constipation  GENITOURINARY:  negative for incontinence, dysuria, bladder emptying problems  HEME:  No easy bruising  INTEGUMENT:  negative for rash and pruritus  NEURO:  Negative for headache, seizure disorder    Allergies:   No Known Allergies    Medications:  Prescription Medications as of 2/10/2021       Rx Number Disp  Refills Start End Last Dispensed Date Next Fill Date Owning Pharmacy    levothyroxine (SYNTHROID/LEVOTHROID) 25 MCG tablet    4/14/2019        Sig: Take 25 mcg by mouth daily    Class: Historical    Route: Oral          Labs:   ABO: B  Chemistries:   Recent Labs   Lab Test 02/01/21  0806 11/06/20  0717     --    POTASSIUM 3.7  --    CHLORIDE 106  --    CO2 29  --    ANIONGAP 5  --    GLC 92 82   BUN 14  --    CR 0.65 0.71   JAMES 9.4  --        Urine Studies:   Recent Labs   Lab Test 02/05/21  0910 02/01/21  0805   COLOR Straw Yellow   APPEARANCE Clear Clear   URINEGLC Negative Negative   URINEBILI Negative Negative   URINEKETONE Negative Negative   SG 1.009 1.015   UBLD Negative Negative   URINEPH 7.0 6.0   PROTEIN Negative Negative   NITRITE Negative Negative   LEUKEST Negative Moderate*   RBCU <1 1   WBCU 0 8*     Recent Labs   Lab Test 02/01/21  0805 11/06/20  0850   UTPG 0.09 0.08   MICROL 7 5       Hematology:      Recent Labs   Lab Test 02/01/21  0806   WBC 5.0   RBC 4.36   HGB 13.6   HCT 41.3   MCV 95   MCH 31.2   MCHC 32.9   RDW 12.5          Coags:   Recent Labs   Lab Test 02/01/21  0806   INR 1.01       Lipid Profile:   Cholesterol   Date Value Ref Range Status   02/01/2021 249 (H) <200 mg/dL Final     Comment:     Desirable:       <200 mg/dl     Triglycerides   Date Value Ref Range Status   02/01/2021 126 <150 mg/dL Final     HDL Cholesterol   Date Value Ref Range Status   02/01/2021 107 >49 mg/dL Final     LDL Cholesterol Calculated   Date Value Ref Range Status   02/01/2021 116 (H) <100 mg/dL Final     Comment:     Above desirable:  100-129 mg/dl  Borderline High:  130-159 mg/dL  High:             160-189 mg/dL  Very high:       >189 mg/dl       Non HDL Cholesterol   Date Value Ref Range Status   02/01/2021 142 (H) <130 mg/dL Final     Comment:     Above Desirable:  130-159 mg/dl  Borderline high:  160-189 mg/dl  High:             190-219 mg/dl  Very high:       >219 mg/dl          Virals:  CMV and EBV pending.     Recent Labs   Lab Test 02/01/21  0806   HBCAB Nonreactive   HEPBANG Nonreactive        Hepatitis C Antibody   Date Value Ref Range Status   02/01/2021 Nonreactive NR^Nonreactive Final     Comment:     Assay performance characteristics have not been established for newborns,   infants, and children         Hepatitis C Antibody   Date Value Ref Range Status   02/01/2021 Nonreactive NR^Nonreactive Final     Comment:     Assay performance characteristics have not been established for newborns,   infants, and children               Again, thank you for allowing me to participate in the care of your patient.        Sincerely,        Kj Wei MD

## 2021-02-05 NOTE — LETTER
2021         RE: Vianca Saavedra  [address] [address]        Dear Colleague,    Thank you for referring your patient, Vianca Saavedra, to the [facility]. Please see a copy of my visit note below.    Donor Iohexol test    Vianca Saavedra presents today to [facility] for a Donor Iohexol test.      Progress note:  ID verified by name and .     The following information was verified with the patient:  Female Patients is there any possibility of being pregnant No  Is there a history of allergy (skin rash, swelling, ect) to:   A.  Iodine (except skin reactions to betadine): No   B. Intravenous radio-contrast agents: No   C. Seafood No     present during visit today: Not Applicable.    R.N. provided patient with educational handout regarding timed test. Yes     Iohexol administered over 2 minutes VIA butterfly  Positive blood return verified before and after injection.   20 gauge PIV placed in LT AC  for blood draws and CT this afternoon.    Medication administered:  Iohexol (Omnipaque 300mg iodine/ml concentration) 5 mls.    Start time: 815  Stop time: 817    Drug Waste Record    Drug Name: Iohexol  Dose: 5 ml  Route administered: IV  NDC #: [id number]  Amount of waste(mL):5 ml  Reason for waste: Single use vial       Administrations This Visit     iohexol (OMNIPAQUE 300) injection 5 mL     Admin Date  2021 Action  Given Dose  5 mL Route  Intravenous Administered By  Meghan Petit RN                Evaluation nurse in transplant to draw labs at 2 and 4 hours post iohexol administration.  Patient given a slip with the times to get labs drawn and verbalized understanding of the plan.    Patient tolerated the procedure:  Yes    After the infusion patient was discharged to the next appointment.    Meghan Petit RN          Again, thank you for allowing me to participate in the care of your patient.         Sincerely,        [facility]

## 2021-02-05 NOTE — PROGRESS NOTES
TRANSPLANT NEPHROLOGY DONOR EVALUATION    Assessment and Plan:  # Prospective Kidney Transplant Donor: Patient with no issues that need to be addressed prior to donation. Patient's blood pressure is acceptable at this visit, kidney function appears to be acceptable with Iohexol pending, and urinalysis is bland.    #Mild leukocyturia: We will repeat UA midstream clean-catch.    Discussed the risks of donating a kidney, including the surgical risk and the possible risks of living with one kidney.    Education about expected post-donation kidney function and how chronic kidney disease (CKD) and end stage kidney disease (ESKD) might potentially impact the donor in the future, include, but not limited to:       - On average, donors will have 25-35% permanent loss of kidney function at donation.       - Baseline risk of ESKD may slightly exceed that of members of the general population with the same demographic profile.       - Donor risks must be interpreted in light of known epidemiology of both CKD or ESKD, such as that CKD generally develops in midlife (40-50 years old) and ESKD generally develops after age 60.       - Medical evaluation of young potential donors cannot predict lifetime risk of CKD or ESKD.       - Donors may be at higher risk for CKD if they sustain damage to the remaining kidney.       - Development of CKD and progression of ESKD may be more rapid with only 1 kidney.       - Some type of kidney replacement therapy, either kidney transplant or dialysis, is required when reaching ESKD.    Potential medical or surgical risks include, but not limited to:       - Death.       - Scars, pain, fatigue, and other consequences typical of any surgical procedure.       - Decreased kidney function.       - Abdominal or bowel symptoms, such as bloating and nausea, and developing bowel obstruction.       - Kidney failure (ESKD) and the need for a kidney transplant or dialysis for the donor.       - Impact of  obesity, hypertension, or other donor-specific medical conditions on morbidity and mortality of the potential donor.    Patients overall evaluation will be discussed with the transplant team and a final recommendation on the patients' suitability to be a kidney transplant donor will be made at that time.    Consult:  Vianca Saavedra was seen in consultation at the request of Dr. Kj Wei for evaluation as a potential kidney transplant donor.    Reason for Visit:  Vianca Saavedra is a 60 year old female who presents for a kidney donor evaluation.  Patient would like to be a non-directed donor.    HPI:      Vianca is a delightful 60-year-old lady who is generally healthy.  She is on levothyroxine for hypothyroidism.  She was diagnosed with an element of hearing loss 5 years ago but not problematic.  She specifically denied any history of hearing loss with kidney disease in the family.  She works full-time as an .  She leads active lifestyle with regular exercises and brisk walking 3 times a week or more.  She does not smoke.  She had history of basal cell cancer on her arm and her chest status post excision.  She sees dermatology once a year.  She is up-to-date on her age-appropriate cancer screening per her report.  She will be due again for colonoscopy in 2 years.         Kidney Disease Hx:       h/o Kidney Problems: No  Family h/o Genetic Kidney Disease: No       h/o HTN: No    Usual BP: wnl       h/o Protein in Urine: No  h/o Blood in Urine: No       h/o Kidney Stones: No  h/o Kidney Injury: No       h/o Recurrent UTI: No  h/o Genitourinary Problems: No       h/o Chronic NSAID Use: No         Other Medical Hx:       h/o DM: No             h/o Gastrointestinal, Pancreas or Liver Problems: No       h/o Lung or Heart Problems: No       h/o Hematologic Problems: No  h/o Bleeding or Clotting Problems: No       h/o Cancer: Yes: basal cell cancer        h/o Infection Problems: No       h/o  Gestational DM: No      h/o Preeclampsia: No         Skin Cancer Risk:       h/o more than 50 moles: No       h/o extensive sun exposure: Yes        h/o melanoma: No       Family h/o melanoma: Yes: Aunt          Mental Health Assessment:       h/o Depression: No       h/o Psychiatric Illness: No       h/o Suicidal Attempt(s): No    Review Of Systems:   A comprehensive review of systems was obtained and negative, except as noted in the HPI or PMH.    Past Medical History:   History was taken from the patient as noted below.  Past Medical History:   Diagnosis Date     Acquired hypothyroidism      Known health problems: none        Past Social History:   Past Surgical History:   Procedure Laterality Date     APPENDECTOMY        SECTION       DILATION AND CURETTAGE       Personal or family history of anesthesia problems: No    Family History:   Family History   Problem Relation Age of Onset     Lung Cancer Mother      Cirrhosis Father           Specific Family History:       FH of DM: No       FH of CAD: Yes   FH of HTN: Yes        FH of Cancer: Yes (lung cancer, and melanoma)  FH of Kidney Cancer: No    Personal History:   Social History     Socioeconomic History     Marital status:      Spouse name: Not on file     Number of children: Not on file     Years of education: Not on file     Highest education level: Not on file   Occupational History     Comment: admin   Social Needs     Financial resource strain: Not on file     Food insecurity     Worry: Not on file     Inability: Not on file     Transportation needs     Medical: Not on file     Non-medical: Not on file   Tobacco Use     Smoking status: Never Smoker     Smokeless tobacco: Never Used   Substance and Sexual Activity     Alcohol use: Yes     Frequency: 4 or more times a week     Drug use: Never     Sexual activity: Not on file   Lifestyle     Physical activity     Days per week: Not on file     Minutes per session: Not on file     Stress: Not  "on file   Relationships     Social connections     Talks on phone: Not on file     Gets together: Not on file     Attends [employer] service: Not on file     Active member of club or organization: Not on file     Attends meetings of clubs or organizations: Not on file     Relationship status: Not on file     Intimate partner violence     Fear of current or ex partner: Not on file     Emotionally abused: Not on file     Physically abused: Not on file     Forced sexual activity: Not on file   Other Topics Concern     Not on file   Social History Narrative     Not on file          Specific Social History:       Health Insurance Status: Yes       Employment Status: Full time  Occupation:                        Living Arrangements: lives alone       Social Support: Yes       Presence of increased risk for disease transmission behaviors as defined by PHS guidelines: No        Allergies:  No Known Allergies    Medications:  Prior to Admission medications    Medication Sig Start Date End Date Taking? Authorizing Provider   levothyroxine (SYNTHROID/LEVOTHROID) 25 MCG tablet Take 25 mcg by mouth daily 4/14/19   Reported, Patient       Vitals:  Vital Signs 2/5/2021 2/5/2021 2/5/2021   Systolic 105 108 113   Diastolic 69 72 71   Pulse 67 - -   Weight (LB) 121 lb 11.2 oz - -   Height 5' 4.764\" - -   BMI (Calculated) 20.4 - -   O2 98 - -       Exam:   GENERAL APPEARANCE: alert and no distress  HENT: mouth without ulcers or lesions  LYMPHATICS: no cervical or supraclavicular nodes  RESP: lungs clear to auscultation - no rales, rhonchi or wheezes  CV: regular rhythm, normal rate, no rub, no murmur  EDEMA: no LE edema bilaterally  ABDOMEN: soft, nondistended, nontender, bowel sounds normal  MS: extremities normal - no gross deformities noted, no evidence of inflammation in joints, no muscle tenderness  SKIN: no rash    Results:   Labs and imaging were ordered for this visit and reviewed by me.  Recent Results (from the past 336 " hour(s))   Protein  random urine with Creat Ratio    Collection Time: 02/01/21  8:05 AM   Result Value Ref Range    Protein Random Urine 0.11 g/L    Protein Total Urine g/gr Creatinine 0.09 0 - 0.2 g/g Cr   Albumin Random Urine Quantitative with Creat Ratio    Collection Time: 02/01/21  8:05 AM   Result Value Ref Range    Creatinine Urine 123 mg/dL    Albumin Urine mg/L 7 mg/L    Albumin Urine mg/g Cr 5.39 0 - 25 mg/g Cr   Routine UA with microscopic    Collection Time: 02/01/21  8:05 AM   Result Value Ref Range    Color Urine Yellow     Appearance Urine Clear     Glucose Urine Negative NEG^Negative mg/dL    Bilirubin Urine Negative NEG^Negative    Ketones Urine Negative NEG^Negative mg/dL    Specific Gravity Urine 1.015 1.003 - 1.035    Blood Urine Negative NEG^Negative    pH Urine 6.0 5.0 - 7.0 pH    Protein Albumin Urine Negative NEG^Negative mg/dL    Urobilinogen mg/dL 0.0 0.0 - 2.0 mg/dL    Nitrite Urine Negative NEG^Negative    Leukocyte Esterase Urine Moderate (A) NEG^Negative    Source Midstream Urine     WBC Urine 8 (H) 0 - 5 /HPF    RBC Urine 1 0 - 2 /HPF    Mucous Urine Present (A) NEG^Negative /LPF   Uric acid    Collection Time: 02/01/21  8:06 AM   Result Value Ref Range    Uric Acid 3.9 2.6 - 6.0 mg/dL   Quantiferon TB Gold Plus    Collection Time: 02/01/21  8:06 AM    Specimen: Blood   Result Value Ref Range    MTB Quantiferon Result Negative NEG^Negative    TB1 Ag minus Nil 0.01 IU/mL    TB2 Ag minus Nil 0.02 IU/mL    Mitogen minus Nil 9.99 IU/mL    NIL Result 0.01 IU/mL   EBV Capsid Antibody IgM    Collection Time: 02/01/21  8:06 AM   Result Value Ref Range    EBV Capsid Antibody IgM <0.2 0.0 - 0.8 AI   EBV Capsid Antibody IgG    Collection Time: 02/01/21  8:06 AM   Result Value Ref Range    EBV Capsid Antibody IgG 5.1 (H) 0.0 - 0.8 AI   CMV Antibody IgG    Collection Time: 02/01/21  8:06 AM   Result Value Ref Range    CMV Antibody IgG <0.2 0.0 - 0.8 AI   CBC with platelets    Collection Time:  02/01/21  8:06 AM   Result Value Ref Range    WBC 5.0 4.0 - 11.0 10e9/L    RBC Count 4.36 3.8 - 5.2 10e12/L    Hemoglobin 13.6 11.7 - 15.7 g/dL    Hematocrit 41.3 35.0 - 47.0 %    MCV 95 78 - 100 fl    MCH 31.2 26.5 - 33.0 pg    MCHC 32.9 31.5 - 36.5 g/dL    RDW 12.5 10.0 - 15.0 %    Platelet Count 169 150 - 450 10e9/L   Partial thromboplastin time    Collection Time: 02/01/21  8:06 AM   Result Value Ref Range    PTT 29 22 - 37 sec   Treponema Abs w Reflex to RPR and Titer    Collection Time: 02/01/21  8:06 AM   Result Value Ref Range    Treponema Antibodies Nonreactive NR^Nonreactive   INR    Collection Time: 02/01/21  8:06 AM   Result Value Ref Range    INR 1.01 0.86 - 1.14   HIV Antigen Antibody Combo Pretransplant    Collection Time: 02/01/21  8:06 AM   Result Value Ref Range    HIV Antigen Antibody Combo Pretransplant Nonreactive NR^Nonreactive   Hepatitis C antibody    Collection Time: 02/01/21  8:06 AM   Result Value Ref Range    Hepatitis C Antibody Nonreactive NR^Nonreactive   Hepatitis B surface antigen    Collection Time: 02/01/21  8:06 AM   Result Value Ref Range    Hep B Surface Agn Nonreactive NR^Nonreactive   Hepatitis B Surface Antibody    Collection Time: 02/01/21  8:06 AM   Result Value Ref Range    Hepatitis B Surface Antibody 0.00 <8.00 m[IU]/mL   Hepatitis B core antibody    Collection Time: 02/01/21  8:06 AM   Result Value Ref Range    Hepatitis B Core Mis Nonreactive NR^Nonreactive   Hemoglobin A1c    Collection Time: 02/01/21  8:06 AM   Result Value Ref Range    Hemoglobin A1C 5.3 0 - 5.6 %   Phosphorus    Collection Time: 02/01/21  8:06 AM   Result Value Ref Range    Phosphorus 3.7 2.5 - 4.5 mg/dL   Comprehensive metabolic panel    Collection Time: 02/01/21  8:06 AM   Result Value Ref Range    Sodium 141 133 - 144 mmol/L    Potassium 3.7 3.4 - 5.3 mmol/L    Chloride 106 94 - 109 mmol/L    Carbon Dioxide 29 20 - 32 mmol/L    Anion Gap 5 3 - 14 mmol/L    Glucose 92 70 - 99 mg/dL    Urea  Nitrogen 14 7 - 30 mg/dL    Creatinine 0.65 0.52 - 1.04 mg/dL    GFR Estimate >90 >60 mL/min/[1.73_m2]    GFR Estimate If Black >90 >60 mL/min/[1.73_m2]    Calcium 9.4 8.5 - 10.1 mg/dL    Bilirubin Total 0.5 0.2 - 1.3 mg/dL    Albumin 4.3 3.4 - 5.0 g/dL    Protein Total 7.8 6.8 - 8.8 g/dL    Alkaline Phosphatase 102 40 - 150 U/L    ALT 35 0 - 50 U/L    AST 22 0 - 45 U/L   Lipid Profile    Collection Time: 02/01/21  8:06 AM   Result Value Ref Range    Cholesterol 249 (H) <200 mg/dL    Triglycerides 126 <150 mg/dL    HDL Cholesterol 107 >49 mg/dL    LDL Cholesterol Calculated 116 (H) <100 mg/dL    Non HDL Cholesterol 142 (H) <130 mg/dL   ABO/Rh type and screen    Collection Time: 02/01/21  8:06 AM   Result Value Ref Range    ABO B     RH(D) Pos     Antibody Screen Neg     Test Valid Only At          [facility]    Specimen Expires 02/04/2021    Bilirubin direct    Collection Time: 02/01/21  8:06 AM   Result Value Ref Range    Bilirubin Direct 0.1 0.0 - 0.2 mg/dL   Asymptomatic COVID-19 Virus (Coronavirus) by PCR    Collection Time: 02/01/21  8:11 AM    Specimen: Nasopharyngeal   Result Value Ref Range    COVID-19 Virus PCR to [facility] - Source Nasopharyngeal     COVID-19 Virus PCR to [facility] - Result       Test received-See reflex to IDDL test SARS CoV2 (COVID-19) Virus RT-PCR   SARS-CoV-2 COVID-19 Virus (Coronavirus) by PCR    Collection Time: 02/01/21  8:11 AM    Specimen: Nasopharyngeal   Result Value Ref Range    SARS-CoV-2 Virus Specimen Source Nasopharyngeal     SARS-CoV-2 PCR Result NEGATIVE     SARS-CoV-2 PCR Comment       Testing was performed using the Xpert Xpress SARS-CoV-2 Assay on the Cepheid Gene-Xpert   Instrument Systems. Additional information about this Emergency Use Authorization (EUA)   assay can be found via the Lab Guide.

## 2021-02-05 NOTE — NURSING NOTE
"Chief Complaint   Patient presents with     Consult     PKE Eval     Blood pressure 113/71, pulse 67, height 1.645 m (5' 4.76\"), weight 55.2 kg (121 lb 11.2 oz), SpO2 98 %.    Aletha Lamas on 2/5/2021 at 8:39 AM    "

## 2021-02-05 NOTE — PROGRESS NOTES
Psychosocial Evaluation  Living Organ Donation per OPTN Policy 14.1.A  Organ Type: Non Directed Kidney Donor  Presenting Information:  Vianca Saavedra presents to the [facility] to complete a psychosocial evaluation since she is interested in becoming a Nondirected kidney donor.  Vianca is a 60 year old  female.  She was neatly dressed and groomed.  Her mood was euthymic.  Thought process logical and goal directed.  She came to the transplant center alone today.  She was pleasant and forthcoming in sharing information..    PERSONAL BACKGROUND:  Current Living Situation: Vianca is renting the basement of a home in [place].  She has a roommate and has been living there since  from her  in .    Education/Employment/Financial Situation: Vianca earned a degree in finance and accounting at [employer].  She works for the [employer], as an  for the [employer]  She is currently working remotely (due to the pandemic),which she enjoys.  She has PTO and short term disability available to her.    Health Insurance Status: She has medical insurance.    Family History: Vianca was reared in [place].  Both parents are .  Her mother  in  from lung cancer and her father  last year form cirrhosis of the liver.  She states his liver issues were not alcohol relate.  She has one sister and two brothers and reports her relationships with her family members were fine.  Vianca and her   in  after 21 years of marriage.  They are not  and she reports they are still friendly and in contact with each other.  They have two daughters, ages 21 and 20 years of age.  Both daughters have suffered with mental health issues.  Approximately seven years ago, the family moved to [place].  Her  was laid off 18 months later, and she has been the sole  breadwinner since that time.  One daughter began struggling with anxiety and chemical abuse.  Some of her daughter's friends were killed at that time, and her daughter ended up in treatment for a few months.  The younger daughter has also had a difficult time.  She was diagnosed with Oppositional Defiant Disorder and is now on medications.  Both daughters are doing relatively well now, but those years when the girls were in struggling in highschool were very taxing on her and she felt an enormous amount of pressure trying to keep the family together.    General Health: She considers herself to be generally healthy.  She had an appendectomy two years ago and recovery went well.  She has had two C-sections.  She has had eye surgery.    Mental Health: She started seeing a therapist two years ago, after her daughters had left home for college, and she felt like she had some time to focus on herself.  She has continued to see this therapist which has been a significant source of support for her.  She denies any mental health diagnosis but wanted added support during this time of transition in her life. She denies any current or past suicidal ideation or attempts.  No psychosis.  No psychiatric hospitalizations.  No history of mental health concerns in her family of origin.     Alcohol and Drug Use/Abuse/Dependency: She drinks approximately five alcoholic drinks per week - either a glass of wine or a beer.  No current or past chemical abuse issues.  No street drug use.  No vaping.    Cigarette Use: Denies    Legal: not currently    Coping with major surgery/associated stress: She does not anticipate having significant problems coping with surgery.  Her past surgeries and recoveries went well, although she does state the her blood pressure got extremely low after a couple of her surgeries.  She does enjoy working out and taking [employer] classes, and doesn't look forward to not being able to do this but would comply with  any lifting restrictions and understands that there would be some deconditioning while recovering.  She enjoys walking and will continue to do this as much as she is able.      Support System: She had a strong network of family and friends who are supportive.  She continues to have deep relationships with her college friends.  She has friends from back him.  She has family who are supportive.  Her roommate has also been a source of support for her.  Vianca enjoys mediation and being more mindful and present.    DONOR SPECIFIC INFORMATION:    Decision Process/Motivation to Donate: Vianca is interested in being a NDD.  She read a story  nine months ago about a nurse who was a Non directed donor, and this peeked her interest in donation.  She began researching donation.  At about that same time, she had a co-worker who suddenly became ill, and ended up on dialysis.  He had additional issues that were identified at that same time, and had to undergo a bone marrow transplant.  She saw how difficult it was for him to have to go to dialysis three times per week, and saw that this is a difficult way to live.  It is at that time that she decided to pursue donation.  She denies pressure, inducement or coercion.   She feels like she is at a point in her life where she can focus on herself, and things that make her happy and content.  She isn't sure about what more is motivating her, and thinks this might be a good thing to talk with her roommate and there therapist about.  She was very open, honest and thoughtful during our meeting.  She has friends and family who will care for her after surgery.  She even thinks her  would be able to care for her since they do remain close friends, even after the divorce.  She has a history of volunteerism, including volunteering at school when her girls were younger, blood donation and indicated a desire to be a  donor on her license.      PREPARATION FOR DONATION, RECOVERY,  AND POTENTIAL SHORT-LONG-TERM OUTCOMES:  Understanding of the Living Donation Process:   We discussed the role of the donor  and Independent Donor Advocate.  Short and long term medical and psychosocial risks to both, donor and recipient were reviewed and she is capable of understanding the risks.  High risk behaviors as defined by US Public Health Services (PHS) 2013 that have potential to increase risk of disease transmission were reviewed and she denies high risk behaviors. Post surgical restrictions (2 weeks no driving, 6 weeks no lifting over 10 lbs) were reviewed and she appears capable of adhering to the post surgical requirements. The need for a caregiver was discussed and she has a number of people who would be able to care for her, including her , roommate and friends and family.  She will be able to firm this up prior to donation .  The risk of poor psychosocial outcome including problems with body image, post-surgery depression or anxiety, or feelings of emotional distress or bereavement if recipient experiences any recurrent disease, poor outcome or death was reviewed.  Additionally, potential financial implications, including the risk of having difficulty obtaining health care insurance, life insurance, disability insurance, or long term care insurance were reviewed, as were available donor grants to assist with donor related expenses.      We also discussed some unique issues that arise with paired kidney donation, which include the uncertainty of the timing and the importance of having a employment situation and support system that is able to provide sustained support and flexibility.    Vianca appears capable of understanding this information and making an informed medical decision.    Impressions/Recommendations:   Vianca  is highly motivated to become a Non directed donor.  Her decision to donate is free of inducement, coercion, or other undue pressure.  She is here today to  gather information on the process and risks, and will decide if/when she would want to donate if she is approved for donation.  She has not had neuropsychological testing yet, but I do not anticipate that there will be contraindications.  Her housing, finances and employment are stable.  No current/active mental health or chemical abuse issues were identified.  The need for a caregiver was reviewed and she is able to identify a plan to meet her post operative care needs.  She appears capable of making an informed medical decision.  No psychosocial contraindications to living organ donation were identified and  I support Vianca Saavedra s desire to be a Non directed kidney donor.       Contact Information:   ALEXANDRA JOYA, [facility]  Clinical  and Independent Donor Advocate  [employer]  Phone - [phone]  Pager - [phone]  [email or web address]      Time Spent: 60 minutes      Living Kidney Donor Consent per OPTN Policy 14.3.A for Independent Living Donor Advocate (SYLVIA)    Written assurance has been obtained from the potential donor that he/she:   Is willing to donate  Is free from inducement and coercion  Has been informed that the he/she may decline to donate at any time  Has been informed that transplant centers must:   A) Offer donors an opportunity to discontinue the donor consent or evaluation process in a way that is protected and confidential  B) Provide an independent living donor advocate (SYLVIA) to assist the potential donor during this process    The following was presented to the potential donor in a language in which the potential donor is able to engage in meaningful dialogue:   Education and instruction about all phases of the living donation process including:   Consent  Medical and psychosocial evaluation  Information about the surgical procedure  Pre and post operative care  Benefits of post operative follow up  Disclosure that the recovery hospital will take all reasonable  precautions to provide confidentiality for the donor/recipient  Disclosure that it is a federal crime for any person to knowingly acquire, obtain or otherwise transfer any human organ for valuable consideration  Disclosure that the [facility] must provide an independent living donor advocate (SYLVIA)  Disclosure that health information obtained during the evaluation is subject to the same regulations as all records and could reveal conditions that must be reported to local, state, or federal public health authorities  Disclosure that the [facility] is required to report living donor follow up information at 6 months, 1 year, and 2 years, and that the potential donor must commit to post operative follow up testing coordinated by the [facility]    Disclosure has been provided that these risks may be transient or permanent & include but are not limited to:  Potential psychosocial risks:  Problems with body image  Post-surgery depression or anxiety  Feelings of emotional distress or bereavement if recipient experiences any recurrent disease or in the event of the recipient s death  Impact of donation on the donor s lifestyle    Potential financial impacts:  Personal expenses of travel, housing, , lost wages related to donation might not be reimbursed. However, resources may be available to defray some donation-related expenses   Need for life-long follow up at the donor s expense  Loss of employment or income  Negative impact on the ability to obtain future employment  Negative impact on the ability to obtain, maintain, or afford health, disability, and life insurance  Future health problems experienced by living donors following donation may not be covered by the recipient s insurance    Contact Information:  ALEXANDRA JOYA, [facility]  Clinical  and Independent Donor Advocate  [employer]  Phone - [phone]  Pager - [phone]  [email or web address]      Time Spent: 60  minutes

## 2021-02-05 NOTE — NURSING NOTE
"Chief Complaint   Patient presents with     Consult     Kidney donor eval     Blood pressure 113/71, pulse 67, height 1.645 m (5' 4.76\"), weight 55.2 kg (121 lb 11.2 oz), SpO2 98 %.    Aletha Lamas on 2/5/2021 at 8:37 AM    "

## 2021-02-09 LAB
BSA: 1.59 M2
IOHEXOL CL UR+SERPL-VRATE: 10.11 MG/DL
IOHEXOL CL UR+SERPL-VRATE: 4.92 MG/DL
IOHEXOL CL UR+SERPL-VRATE: 82 ML/MIN
IOHEXOL CL UR+SERPL-VRATE: 90 /1.73 M2

## 2021-02-10 ENCOUNTER — TELEPHONE (OUTPATIENT)
Dept: TRANSPLANT | Facility: CLINIC | Age: 61
End: 2021-02-10

## 2021-02-10 ENCOUNTER — COMMITTEE REVIEW (OUTPATIENT)
Dept: TRANSPLANT | Facility: CLINIC | Age: 61
End: 2021-02-10

## 2021-02-10 DIAGNOSIS — Z00.5 EXAMINATION OF POTENTIAL DONOR OF ORGAN AND TISSUE: Primary | ICD-10-CM

## 2021-02-10 NOTE — TELEPHONE ENCOUNTER
I spoke to the patient about her results of the donor evaluation.  I reviewed her CT and Iohexol results which were normal.  I reviewed the recommendation from the donor team to have a echocardiogram of her heart due to the tracing of the Ekg was slightly abnormal.  She is okay coming to [facility], would like the schedulers to see if there is a Saturday appointment.  Also I reviewed that she will need to see Neuropsych to complete her Nondirect donor evaluation.  I will place orders and task schedulers to set up.  I answered her questions.

## 2021-02-10 NOTE — COMMITTEE REVIEW
Living Donor Committee Review Note Evaluation Date: 2/5/2021  Committee Review Date: 2/10/2021    Donor being evaluated for: Kidney    Transplant Phase: Evaluation  Transplant Status: Active    Transplant Coordinator: Lorenza Betancourt  Transplant Surgeon:       Committee Review Members:  Immunology Raad Uriostegui, PhD   Nephrology Harmeet Morris MD, Uday Torrez MD, Yfn Gamboa MD   Nutrition Ana Laura Pineda,    Pharmacist Servando Burk, [facility]    - Clinical Chelle Culp, [facility], Evelyn Valero, [facility]   Transplant Alba Gloria Chandler, LAUREN, Krysta Dawson, LAUREN, Kj Wei MD, Dean Morris, LAUREN, Jasmyne Masterson CHERYL, Lorenza Betancourt RN, Donald Michaels MD, Velma Argueta MD, MD       Transplant Eligibility:     Committee Review Decision: Needs Re-presentation    Relative Contraindications: None    Absolute Contraindications:     Committee Chair Velma Argueta MD verbally attested to the committee's decision.    Committee Discussion Details:   Reviewed the lab results, consultations and imaging reports.  The donor team recommends 2D Echocardiogram due to Ekg finding of Left axis deviation low voltage qrs septal infarct.  The patient will need NeuroPsych testing to complete the NDD evaluation.    The CT report was reviewed and the images will be reviewed at the designated CT meeting.    Addendum on 2/11/21 CT Review Meeting Attended by Dr Ayden Sweet and Lorenza Betancourt RN:  Reviewed CT report and Ct images.  Laterality choice is Left but Right is available.    Lorenza Betancourt RN  Living Donor Coordinator  02/11/2021 4:32 PM

## 2021-02-10 NOTE — PROGRESS NOTES
[facility]  Consult Note     Medical record number: [id number]  YOB: 1960,     Date of Visit:  21  Consult requested by the patient for evaluation of kidney donation candidacy.    Assessment and Recommendations: Ms. Saavedra appears to be a good candidate for kidney donation at this point in the evaluation. The following issues will need to be addressed prior to formal review:    Iohexal GFR  CT angio    PMH: Basal cell skin cancer            S/P -  x 2            appendectomy     Potential recipient: nondirected    Risks of the surgical procedure including but not limited to the rare risk of mortality discussed in detail. Patient verbalized good understanding and had several pertinent questions which were answered.     The majority of our visit today was spent in counseling regarding the medical and surgical risks of kidney donation; the typical yony-and post-operative experience and recovery/return to work pattern; restrictions related to the surgery (driving; lifting; exercise).      We also talked about post-op visits and longer term health care maintenance, as well as the implications of having one remaining kidney. This discussion included, but was not limited to rates of complications such as bleeding, infection, need for transfusion, reoperation, other organ injury, future bowel obstruction, incisional hernia, port site pain, varicocele, venous thrombosis, pulmonary embolism, renal failure, and death (3 per 10,000).     We discussed the option of donating directly to a recipient in our program; and the advantage would be the option to choose the date of donation. And we discussed the advantage of starting  a  chain  in the national paired exchange system; and the advantage would be that multiple transplants would occur as a result of the donation (but the logistics were more complicated).  I explained how a chain was developed and worked.    At the  conclusion of the visit, all questions had been answered.  I explained that her candidacy for donation will be reviewed at our Multidisciplinary Donor Selection Committee, and that he/she would subsequently be contacted   by his/her coordinator.  I recommended that she call her coordinator if there any additional questions at the end of the evaluation process; and that we would be glad to spend time discussing any concerns.     Total time: 40 minutes  Counselling time: 30 minutes        Kj Wei MD  Surgical Director, Kidney Transplantation                                                                                                        ---------------------------------------------------------------------------------------------------    HPI: Ms. Saavedra wishes to donate a kidney to nondirected.           NO  Personal history of cancer    []         Comment:     Personal history of kidney problems   []         Comment:   Personal history of diabetes    []         Comment:                  Bladder emptying problems (prostate, urinary retention) []         Comment:   Neck or Back problems:     []         Comment:   Constipation      []         Comment:       Frequent NSAID use:         []         Comment:       Other:        []         Comment:                Past Medical History:   Diagnosis Date     Acquired hypothyroidism      Known health problems: none      Past Surgical History:   Procedure Laterality Date     APPENDECTOMY        SECTION       DILATION AND CURETTAGE       Family History   Problem Relation Age of Onset     Lung Cancer Mother      Cirrhosis Father      Social History     Socioeconomic History     Marital status:      Spouse name: Not on file     Number of children: Not on file     Years of education: Not on file     Highest education level: Not on file   Occupational History     Comment: admin   Social Needs     Financial resource strain: Not on file     Food  insecurity     Worry: Not on file     Inability: Not on file     Transportation needs     Medical: Not on file     Non-medical: Not on file   Tobacco Use     Smoking status: Never Smoker     Smokeless tobacco: Never Used   Substance and Sexual Activity     Alcohol use: Yes     Frequency: 4 or more times a week     Drug use: Never     Sexual activity: Not on file   Lifestyle     Physical activity     Days per week: Not on file     Minutes per session: Not on file     Stress: Not on file   Relationships     Social connections     Talks on phone: Not on file     Gets together: Not on file     Attends [employer]ism service: Not on file     Active member of club or organization: Not on file     Attends meetings of clubs or organizations: Not on file     Relationship status: Not on file     Intimate partner violence     Fear of current or ex partner: Not on file     Emotionally abused: Not on file     Physically abused: Not on file     Forced sexual activity: Not on file   Other Topics Concern     Not on file   Social History Narrative     Not on file       ROS:   CONSTITUTIONAL:  No fevers or chills  EYES: negative for icterus  ENT:  negative for hearing loss, tinnitus and sore throat  RESPIRATORY:  negative for cough, sputum, dyspnea  CARDIOVASCULAR:  negative for chest pain  GASTROINTESTINAL:  negative for nausea, vomiting, diarrhea or constipation  GENITOURINARY:  negative for incontinence, dysuria, bladder emptying problems  HEME:  No easy bruising  INTEGUMENT:  negative for rash and pruritus  NEURO:  Negative for headache, seizure disorder    Allergies:   No Known Allergies    Medications:  Prescription Medications as of 2/10/2021       Rx Number Disp Refills Start End Last Dispensed Date Next Fill Date Owning Pharmacy    levothyroxine (SYNTHROID/LEVOTHROID) 25 MCG tablet    4/14/2019        Sig: Take 25 mcg by mouth daily    Class: Historical    Route: Oral          Labs:   ABO: B  Chemistries:   Recent Labs   Lab Test  02/01/21  0806 11/06/20  0717     --    POTASSIUM 3.7  --    CHLORIDE 106  --    CO2 29  --    ANIONGAP 5  --    GLC 92 82   BUN 14  --    CR 0.65 0.71   JAMES 9.4  --        Urine Studies:   Recent Labs   Lab Test 02/05/21  0910 02/01/21  0805   COLOR Straw Yellow   APPEARANCE Clear Clear   URINEGLC Negative Negative   URINEBILI Negative Negative   URINEKETONE Negative Negative   SG 1.009 1.015   UBLD Negative Negative   URINEPH 7.0 6.0   PROTEIN Negative Negative   NITRITE Negative Negative   LEUKEST Negative Moderate*   RBCU <1 1   WBCU 0 8*     Recent Labs   Lab Test 02/01/21  0805 11/06/20  0850   UTPG 0.09 0.08   MICROL 7 5       Hematology:      Recent Labs   Lab Test 02/01/21  0806   WBC 5.0   RBC 4.36   HGB 13.6   HCT 41.3   MCV 95   MCH 31.2   MCHC 32.9   RDW 12.5          Coags:   Recent Labs   Lab Test 02/01/21  0806   INR 1.01       Lipid Profile:   Cholesterol   Date Value Ref Range Status   02/01/2021 249 (H) <200 mg/dL Final     Comment:     Desirable:       <200 mg/dl     Triglycerides   Date Value Ref Range Status   02/01/2021 126 <150 mg/dL Final     HDL Cholesterol   Date Value Ref Range Status   02/01/2021 107 >49 mg/dL Final     LDL Cholesterol Calculated   Date Value Ref Range Status   02/01/2021 116 (H) <100 mg/dL Final     Comment:     Above desirable:  100-129 mg/dl  Borderline High:  130-159 mg/dL  High:             160-189 mg/dL  Very high:       >189 mg/dl       Non HDL Cholesterol   Date Value Ref Range Status   02/01/2021 142 (H) <130 mg/dL Final     Comment:     Above Desirable:  130-159 mg/dl  Borderline high:  160-189 mg/dl  High:             190-219 mg/dl  Very high:       >219 mg/dl         Virals:  CMV and EBV pending.     Recent Labs   Lab Test 02/01/21  0806   HBCAB Nonreactive   HEPBANG Nonreactive        Hepatitis C Antibody   Date Value Ref Range Status   02/01/2021 Nonreactive NR^Nonreactive Final     Comment:     Assay performance characteristics have not been  established for newborns,   infants, and children         Hepatitis C Antibody   Date Value Ref Range Status   02/01/2021 Nonreactive NR^Nonreactive Final     Comment:     Assay performance characteristics have not been established for newborns,   infants, and children

## 2021-02-23 ENCOUNTER — ANCILLARY PROCEDURE (OUTPATIENT)
Dept: CARDIOLOGY | Facility: CLINIC | Age: 61
End: 2021-02-23
Attending: INTERNAL MEDICINE
Payer: COMMERCIAL

## 2021-02-23 DIAGNOSIS — Z00.5 EXAMINATION OF POTENTIAL DONOR OF ORGAN AND TISSUE: ICD-10-CM

## 2021-02-23 PROCEDURE — 93306 TTE W/DOPPLER COMPLETE: CPT | Mod: GC | Performed by: INTERNAL MEDICINE

## 2021-03-03 ENCOUNTER — COMMITTEE REVIEW (OUTPATIENT)
Dept: TRANSPLANT | Facility: CLINIC | Age: 61
End: 2021-03-03

## 2021-03-04 NOTE — COMMITTEE REVIEW
Living Donor Committee Review Note Evaluation Date: 2/5/2021  Committee Review Date: 3/3/2021    Donor being evaluated for: Kidney    Transplant Phase: Evaluation  Transplant Status: Active    Transplant Coordinator: Lorenza Betancourt  Transplant Surgeon:       Committee Review Members:  Nephrology Harmeet Morris MD, Peewee Oakley MD, Yfn Gamboa MD   Nutrition Ana Laura Pineda,    Pharmacy Silva Flood, [facility]    - Clinical Chelle Culp, [facility], Evelyn Valero, [facility]   Transplant Krysta Dawson, LAUREN, Dean Morris, LAUREN, Jasmyne Masterson N, Lorenza Betancourt, LAUREN, Luana Valenzuela, LAUREN, Donald Michaels MD, Velma Argueta MD, MD       Transplant Eligibility:     Committee Review Decision: Needs Re-presentation    Relative Contraindications:     Absolute Contraindications:     Committee Chair Donald Michaels MD verbally attested to the committee's decision.    Committee Discussion Details:   Reviewed the Echo results, there is notation of reduced left ventricular function.  Recommend that she have stress test.

## 2021-03-05 ENCOUNTER — TELEPHONE (OUTPATIENT)
Dept: TRANSPLANT | Facility: CLINIC | Age: 61
End: 2021-03-05

## 2021-03-05 NOTE — TELEPHONE ENCOUNTER
I called the patient to review the results of her echocardiogram.  The donor team recommends she have a stress test.  I answered her questions.  I will place order and request to .  Also I will check on the scheduling of neuropsych visit.

## 2021-03-07 ENCOUNTER — HEALTH MAINTENANCE LETTER (OUTPATIENT)
Age: 61
End: 2021-03-07

## 2021-03-09 DIAGNOSIS — Z00.5 EXAMINATION OF POTENTIAL DONOR OF ORGAN AND TISSUE: Primary | ICD-10-CM

## 2021-03-25 ENCOUNTER — HOSPITAL ENCOUNTER (OUTPATIENT)
Dept: CARDIOLOGY | Facility: CLINIC | Age: 61
Discharge: HOME | End: 2021-03-25
Attending: INTERNAL MEDICINE | Admitting: INTERNAL MEDICINE

## 2021-03-25 DIAGNOSIS — Z01.818 PRE-OP EVALUATION: ICD-10-CM

## 2021-03-25 PROCEDURE — 93325 DOPPLER ECHO COLOR FLOW MAPG: CPT | Mod: 26 | Performed by: INTERNAL MEDICINE

## 2021-03-25 PROCEDURE — 255N000002 HC RX 255 OP 636: Performed by: INTERNAL MEDICINE

## 2021-03-25 PROCEDURE — 93350 STRESS TTE ONLY: CPT | Mod: 26 | Performed by: INTERNAL MEDICINE

## 2021-03-25 PROCEDURE — 93321 DOPPLER ECHO F-UP/LMTD STD: CPT | Mod: 26 | Performed by: INTERNAL MEDICINE

## 2021-03-25 PROCEDURE — 93018 CV STRESS TEST I&R ONLY: CPT | Performed by: INTERNAL MEDICINE

## 2021-03-25 PROCEDURE — 93325 DOPPLER ECHO COLOR FLOW MAPG: CPT | Mod: TC

## 2021-03-25 PROCEDURE — 93016 CV STRESS TEST SUPVJ ONLY: CPT | Performed by: INTERNAL MEDICINE

## 2021-03-25 PROCEDURE — 999N000127 HC STATISTIC PERIPHERAL IV START W US GUIDANCE

## 2021-03-25 RX ADMIN — HUMAN ALBUMIN MICROSPHERES AND PERFLUTREN 5 ML: 10; .22 INJECTION, SOLUTION INTRAVENOUS at 15:21

## 2021-03-31 ENCOUNTER — DOCUMENTATION ONLY (OUTPATIENT)
Dept: TRANSPLANT | Facility: CLINIC | Age: 61
End: 2021-03-31

## 2021-03-31 ENCOUNTER — COMMITTEE REVIEW (OUTPATIENT)
Dept: TRANSPLANT | Facility: CLINIC | Age: 61
End: 2021-03-31

## 2021-04-01 ENCOUNTER — TELEPHONE (OUTPATIENT)
Dept: TRANSPLANT | Facility: CLINIC | Age: 61
End: 2021-04-01

## 2021-04-01 NOTE — COMMITTEE REVIEW
Living Donor Committee Review Note Evaluation Date: 2/5/2021  Committee Review Date: 3/31/2021    Donor being evaluated for: Kidney    Transplant Phase: Evaluation  Transplant Status: Active    Transplant Coordinator: Lorenza Betancourt  Transplant Surgeon:       Committee Review Members:  Immunology Raad Uriostegui, PhD   Nephrology Harmeet Morris MD, Uday Torrez MD, Yfn Gamboa MD   Nutrition Ana Laura Pineda,    Pharmacy Servando Burk, [facility]    - Clinical Chelle Culp, [facility]   Transplant Alba Glorai Chandler, LAUREN, Krysta Dawson, LAUREN, Dean Morris, LAUREN, Jasmyne Masterson N, Lorenza Betancourt RN, Luana Valenzuela, LAUREN, Peewee Oakley MD   Transplant Surgery Ayden Sweet MD       Transplant Eligibility:     Committee Review Decision: Needs Re-presentation    Relative Contraindications:     Absolute Contraindications:     Committee Chair Ayden Sweet MD verbally attested to the committee's decision.    Committee Discussion Details: Reviewed the Stress test results which were normal.    Pending item to complete her evaluation is NeuroPsych testing.    Lorenza Betancourt RN  Living Donor Coordinator  03/31/2021 7:58 PM

## 2021-04-01 NOTE — TELEPHONE ENCOUNTER
I left a message with my contact number. I would like to review her results.  I will reach out to the Neuropsych department to check on the status of her scheduling appointment.

## 2021-04-05 ENCOUNTER — DOCUMENTATION ONLY (OUTPATIENT)
Dept: TRANSPLANT | Facility: CLINIC | Age: 61
End: 2021-04-05

## 2021-04-05 NOTE — PROGRESS NOTES
Reviewed her results of the Stress Test with the patient.  She would like to obtain a copy of the record.  I have sent them to her.  She is scheduled to see Dr Anne on April 29.  I will ask about the length of the appoitnment and get back to the patient.  I answered her questions.  Lorenza Betancourt RN  Living Donor Coordinator  04/05/2021 3:45 PM

## 2021-04-29 ENCOUNTER — VIRTUAL VISIT (OUTPATIENT)
Dept: NEUROPSYCHOLOGY | Facility: CLINIC | Age: 61
End: 2021-04-29
Attending: INTERNAL MEDICINE

## 2021-04-29 DIAGNOSIS — Z00.5 TRANSPLANT DONOR EVALUATION: Primary | ICD-10-CM

## 2021-04-29 PROCEDURE — 96131 PSYCL TST EVAL PHYS/QHP EA: CPT | Mod: 95

## 2021-04-29 PROCEDURE — 96138 PSYCL/NRPSYC TECH 1ST: CPT | Mod: 95

## 2021-04-29 PROCEDURE — 96139 PSYCL/NRPSYC TST TECH EA: CPT | Mod: 95

## 2021-04-29 PROCEDURE — 90791 PSYCH DIAGNOSTIC EVALUATION: CPT | Mod: 95

## 2021-04-29 PROCEDURE — 96130 PSYCL TST EVAL PHYS/QHP 1ST: CPT | Mod: 95

## 2021-04-29 NOTE — PROGRESS NOTES
Pt was seen for psychological evaluation at the request of Yfn Gamboa MD for the purposes of diagnostic clarification and treatment planning. 63 minutes of test administration and scoring were provided by this writer. Please see Dr. Froylan Anne's report for a full interpretation of the findings.    Video start - 10:28  Video stop - 11:24      Shy Del Cid   Psychometrist
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
## 2021-05-12 ENCOUNTER — DOCUMENTATION ONLY (OUTPATIENT)
Dept: TRANSPLANT | Facility: CLINIC | Age: 61
End: 2021-05-12

## 2021-05-12 ENCOUNTER — TELEPHONE (OUTPATIENT)
Dept: TRANSPLANT | Facility: CLINIC | Age: 61
End: 2021-05-12

## 2021-05-12 NOTE — PROGRESS NOTES
Sent Buccal Swab kit to home address NDD.  [employer] tracking out # [id number], Return to Immunology # [id number].

## 2021-05-12 NOTE — TELEPHONE ENCOUNTER
D/I:  Phone call to Vianca today asking that she sign a release of information form giving me permission to contact her therapist to ensure that he/she does not have concerns with Vianca's mental health and ability to withstand the stress of a major surgery.  I also sent her an e-mail with the CODY for her to sign and return to me.  I will contact her therapist once I have written permission to do so.  A:  No psychosocial contraindications to donation exist and will connect with therapist to ensure his/her support.  P:  Will connect with therapist once CODY signed/returned to me.

## 2021-05-19 ENCOUNTER — COMMITTEE REVIEW (OUTPATIENT)
Dept: TRANSPLANT | Facility: CLINIC | Age: 61
End: 2021-05-19

## 2021-05-25 ENCOUNTER — TELEPHONE (OUTPATIENT)
Dept: TRANSPLANT | Facility: CLINIC | Age: 61
End: 2021-05-25

## 2021-05-25 NOTE — TELEPHONE ENCOUNTER
D/I:  Receive signed Release of Information form for me to contact Jason's therapist, Emy Sierra, which I was able to do this morning.  Emy Willis has been working with Vianca for a couple of years.  She states she has no concerns at all with Jason pursuing Nondirected kidney donation.  States she is very forthcoming when she has needs, she knows how to use her resources and has good coping skills.  She will be available for support prior to and post donation as needed.   A:  No concerns expressed by pt's outpt therapist and she will remain available for support as needed.  She is doing well from mental health standpoint.  P:  Donor /SYLVIA will remain available to support Tiagoshobha throughout this process and I support her moving forward with donation.

## 2021-05-26 NOTE — COMMITTEE REVIEW
Living Donor Committee Review Note Evaluation Date: 2/5/2021  Committee Review Date: 5/19/2021    Donor being evaluated for: Kidney    Transplant Phase: Evaluation  Transplant Status: Active    Transplant Coordinator: Lorenza Betancourt  Transplant Surgeon:       Committee Review Members:  Immunology Raad Uriostegui, PhD   Nephrology Harmeet Morris MD, Uday Torrez MD, Yfn Gamboa MD   Nutrition Ana Laura Pineda,    Pharmacy Silva Flood, [facility]    - Clinical Evelyn Valero, [facility]   Transplant Alba Gloria Chandler, LAUREN, Krysta Dawson, LAUREN, Umair Richardson MD, Ayden Sweet MD, Ayla Clark, NP, Dean Morris, LAUREN, Jasmyne Mastersno, LPN, Lorenza Betancourt, LAUREN, Rachael Doss, APRN CNP, Luana Valenzuela RN, Donald Michaels MD, Peewee Oakley MD, Velma Argueta MD, MD       Transplant Eligibility: Acceptable Mental Health, Acceptable Physical Health, Pending clearance from Therapist    Committee Review Decision: Approved    Relative Contraindications: None    Absolute Contraindications: None    Committee Chair Velma Argueta MD verbally attested to the committee's decision.    Committee Discussion Details: Reviewed Neuro Psych consult, recommend feedback received from therapist, otherwise no psychosocial concerns.  Patient is approved pending therapist input.    Lorenza Betancourt RN  Living Donor Coordinator  05/26/2021 6:59 PM

## 2021-05-28 ENCOUNTER — RECORDS - HEALTHEAST (OUTPATIENT)
Dept: ADMINISTRATIVE | Facility: CLINIC | Age: 61
End: 2021-05-28

## 2021-05-28 NOTE — ANESTHESIA CARE TRANSFER NOTE
Last vitals:   Vitals:    05/10/19 2130   BP: 126/60   Pulse: 73   Resp: 23   Temp:    SpO2: 100%   Temp: 36.5 temporal      Patient's level of consciousness is awake and drowsy  Spontaneous respirations: yes  Maintains airway independently: yes  Dentition unchanged: yes  Oropharynx: oropharynx clear of all foreign objects    QCDR Measures:  ASA# 20 - Surgical Safety Checklist: WHO surgical safety checklist completed prior to induction    PQRS# 430 - Adult PONV Prevention: 4558F - Pt received => 2 anti-emetic agents (different classes) preop & intraop  ASA# 8 - Peds PONV Prevention: NA - Not pediatric patient, not GA or 2 or more risk factors NOT present  PQRS# 424 - Aparna-op Temp Management: 4559F - At least one body temp DOCUMENTED => 35.5C or 95.9F within required timeframe  PQRS# 426 - PACU Transfer Protocol: - Transfer of care checklist used  ASA# 14 - Acute Post-op Pain: ASA14B - Patient did NOT experience pain >= 7 out of 10     Volatile agents turned off, muscle relaxant reversed, 4/4 twitches with sustained tetany. Pt breathing spontaneously with adequate tidal volumes, following commands, gently suctioned oropharynx, extubated without issue. 10LPM O2 applied via face mask.Transported by CRNA and RN to recovery.

## 2021-05-28 NOTE — ANESTHESIA POSTPROCEDURE EVALUATION
Patient: Vianca Saavedra  APPENDECTOMY, LAPAROSCOPIC  Anesthesia type: general    Patient location: PACU  Last vitals:   Vitals Value Taken Time   /59 5/10/2019 10:00 PM   Temp 38.1  C (100.5  F) 5/10/2019 10:00 PM   Pulse 68 5/10/2019 10:09 PM   Resp 30 5/10/2019 10:08 PM   SpO2 96 % 5/10/2019 10:09 PM   Vitals shown include unvalidated device data.  Post vital signs: stable  Level of consciousness: awake and responds to simple questions  Post-anesthesia pain: pain controlled  Post-anesthesia nausea and vomiting: no  Pulmonary: unassisted, return to baseline  Cardiovascular: stable and blood pressure at baseline  Hydration: adequate  Anesthetic events: no    QCDR Measures:  ASA# 11 - Aparna-op Cardiac Arrest: ASA11B - Patient did NOT experience unanticipated cardiac arrest  ASA# 12 - Aparna-op Mortality Rate: ASA12B - Patient did NOT die  ASA# 13 - PACU Re-Intubation Rate: ASA13B - Patient did NOT require a new airway mgmt  ASA# 10 - Composite Anes Safety: ASA10A - No serious adverse event    Additional Notes:

## 2021-05-28 NOTE — ANESTHESIA PREPROCEDURE EVALUATION
Anesthesia Evaluation      Patient summary reviewed   No history of anesthetic complications     Airway   Mallampati: II  Neck ROM: full   Pulmonary - negative ROS    breath sounds clear to auscultation  (-) rhonchi, wheezes, rales, stridor                         Cardiovascular - negative ROS  Exercise tolerance: > or = 4 METS  (-) murmur  Rhythm: regular  Rate: normal,    no murmur      Neuro/Psych - negative ROS     Endo/Other    (+) hypothyroidism,      GI/Hepatic/Renal - negative ROS      Other findings: Labs 5/10/19:  WBC 12.8, Hgb 12.9, Plt 153  Na 134, K 4.1, BUN 15, Cr 0.70      Dental - normal exam                        Anesthesia Plan  Planned anesthetic: general endotracheal  GETA.  High risk for PONV and plan for scopolamine patch, dexamethasone, zofran and low-dose propofol gtt (25-50 mcg/kg/min).  Ketamine 50 mg for opioid sparing.   Ketorolac 30 mg at EOC.  ASA 2 - emergent   Induction: intravenous   Anesthetic plan and risks discussed with: patient and spouse  Anesthesia plan special considerations: antiemetics,   Post-op plan: routine recovery

## 2021-05-28 NOTE — TELEPHONE ENCOUNTER
"Pt's  Earnest reports pt is having RLQ abdominal pain since Wednesday night, \"really bad yesterday\". Dry heaving, No fever. RLQ tender. Pain began in epigastric region and then moved down. Yesterday 7-8, in bed all day today. Needing to bend over to walk.     Advised Earnest to bring pt to the ER now. Ask for wheelchair when he gets there to assist getting pt into hospital.     Earnest verbalizes undestanding and agrees to plan.     Reason for Disposition    [1] SEVERE pain (e.g., excruciating) AND [2] present > 1 hour    Protocols used: ABDOMINAL PAIN - FEMALE-A-AH      "

## 2021-05-29 NOTE — PROGRESS NOTES
"HPI: Pt is here for follow up of a lap appendectomy two weeks ago 5/10/19. .   she is doing well.  Pain is well controlled.  No difficulties with the surgical wound/wounds.  she is eating well and denies fever and chills.     Has some tenderness lower port and is concerned    /58   Pulse 60   Ht 5' 6\" (1.676 m)   Wt 122 lb 3.2 oz (55.4 kg)   SpO2 98%   BMI 19.72 kg/m      EXAM:  GENERAL:Appears well  ABDOMEN:  Soft, +BS  SURGICAL WOUNDS:  Incisions healing well, no enduration or drainage.  APPENDIX, APPENDECTOMY:     -  ACUTE APPENDICITIS AND PERIAPPENDICITIS, SUPPURATIVE AND NECROTIZING, WITH        EVIDENCE OF PERFORATION     -  NO TUMOR SEEN      Assessment/Plan: . Doing well after surgery and should follow up as needed.discussed no hernia or problems seen  Jenny Alatorre PA-C  [facility] Department of Surgery    "

## 2021-06-03 VITALS — HEIGHT: 66 IN | WEIGHT: 120 LBS | BODY MASS INDEX: 19.29 KG/M2

## 2021-06-03 VITALS — WEIGHT: 122.2 LBS | HEIGHT: 66 IN | BODY MASS INDEX: 19.64 KG/M2

## 2021-06-07 ENCOUNTER — DOCUMENTATION ONLY (OUTPATIENT)
Dept: TRANSPLANT | Facility: CLINIC | Age: 61
End: 2021-06-07

## 2021-06-07 DIAGNOSIS — Z00.5 EXAMINATION OF POTENTIAL DONOR OF ORGAN AND TISSUE: Primary | ICD-10-CM

## 2021-06-07 NOTE — CONFIDENTIAL NOTE
Pharmacy Living Kidney Donor Medication Evaluation     This patient is a 60 year old female being considered for living kidney donation. As part of the kidney pre-donation patient evaluation, pharmacy has screened this patient's electronic medical record for medication related concerns.    Assessment / Plan    No significant potential medication related issues are expected for this patient post surgery, based on the medical record medication list review.  Pharmacy will continue to participate in this patient's care throughout the surgery course.  Please contact pharmacy with any further medication related questions or concerns.     Silva Flood [facility]  [phone]

## 2021-10-11 ENCOUNTER — HEALTH MAINTENANCE LETTER (OUTPATIENT)
Age: 61
End: 2021-10-11

## 2022-01-27 ENCOUNTER — TELEPHONE (OUTPATIENT)
Dept: TRANSPLANT | Facility: CLINIC | Age: 62
End: 2022-01-27
Payer: COMMERCIAL

## 2022-01-27 NOTE — TELEPHONE ENCOUNTER
Vianca,  Hope this note finds you well.  Just wondering if you are still interested in donation.  If this is not a good time we understand.  At anytime you an reach out to Bennie, your coordinator.  Her direct number is [phone]

## 2022-02-24 ENCOUNTER — LAB REQUISITION (OUTPATIENT)
Dept: LAB | Facility: HOSPITAL | Age: 62
End: 2022-02-24

## 2022-02-24 LAB
MEV IGG SER IA-ACNC: 80.5 AU/ML
MEV IGG SER IA-ACNC: POSITIVE
MUMPS ANTIBODY IGG INSTRUMENT VALUE: 13.1 AU/ML
MUV IGG SER QL IA: POSITIVE
RUBV IGG SERPL QL IA: 9.77 INDEX
RUBV IGG SERPL QL IA: POSITIVE

## 2022-02-24 PROCEDURE — 86735 MUMPS ANTIBODY: CPT | Performed by: INTERNAL MEDICINE

## 2022-02-24 PROCEDURE — 86481 TB AG RESPONSE T-CELL SUSP: CPT | Performed by: INTERNAL MEDICINE

## 2022-02-24 PROCEDURE — 86762 RUBELLA ANTIBODY: CPT | Performed by: INTERNAL MEDICINE

## 2022-02-24 PROCEDURE — 86765 RUBEOLA ANTIBODY: CPT | Performed by: INTERNAL MEDICINE

## 2022-02-24 PROCEDURE — 36415 COLL VENOUS BLD VENIPUNCTURE: CPT | Performed by: INTERNAL MEDICINE

## 2022-02-25 LAB
GAMMA INTERFERON BACKGROUND BLD IA-ACNC: 0.03 IU/ML
M TB IFN-G BLD-IMP: NEGATIVE
M TB IFN-G CD4+ BCKGRND COR BLD-ACNC: 4.21 IU/ML
MITOGEN IGNF BCKGRD COR BLD-ACNC: 0.01 IU/ML
MITOGEN IGNF BCKGRD COR BLD-ACNC: 0.01 IU/ML
QUANTIFERON MITOGEN: 4.24 IU/ML
QUANTIFERON NIL TUBE: 0.03 IU/ML
QUANTIFERON TB1 TUBE: 0.04 IU/ML
QUANTIFERON TB2 TUBE: 0.04

## 2022-03-27 ENCOUNTER — HEALTH MAINTENANCE LETTER (OUTPATIENT)
Age: 62
End: 2022-03-27

## 2022-09-24 ENCOUNTER — HEALTH MAINTENANCE LETTER (OUTPATIENT)
Age: 62
End: 2022-09-24

## 2023-05-08 ENCOUNTER — HEALTH MAINTENANCE LETTER (OUTPATIENT)
Age: 63
End: 2023-05-08